# Patient Record
Sex: FEMALE | Race: OTHER | HISPANIC OR LATINO | URBAN - METROPOLITAN AREA
[De-identification: names, ages, dates, MRNs, and addresses within clinical notes are randomized per-mention and may not be internally consistent; named-entity substitution may affect disease eponyms.]

---

## 2020-09-26 VITALS
OXYGEN SATURATION: 96 % | RESPIRATION RATE: 16 BRPM | SYSTOLIC BLOOD PRESSURE: 176 MMHG | DIASTOLIC BLOOD PRESSURE: 91 MMHG | TEMPERATURE: 99 F | HEART RATE: 66 BPM

## 2020-09-26 LAB
ALBUMIN SERPL ELPH-MCNC: 3.9 G/DL — SIGNIFICANT CHANGE UP (ref 3.3–5)
ALP SERPL-CCNC: 93 U/L — SIGNIFICANT CHANGE UP (ref 40–120)
ALT FLD-CCNC: 18 U/L — SIGNIFICANT CHANGE UP (ref 10–45)
ANION GAP SERPL CALC-SCNC: 10 MMOL/L — SIGNIFICANT CHANGE UP (ref 5–17)
APTT BLD: 27.4 SEC — LOW (ref 27.5–35.5)
AST SERPL-CCNC: 16 U/L — SIGNIFICANT CHANGE UP (ref 10–40)
BASOPHILS # BLD AUTO: 0.05 K/UL — SIGNIFICANT CHANGE UP (ref 0–0.2)
BASOPHILS NFR BLD AUTO: 0.7 % — SIGNIFICANT CHANGE UP (ref 0–2)
BILIRUB SERPL-MCNC: 0.4 MG/DL — SIGNIFICANT CHANGE UP (ref 0.2–1.2)
BLD GP AB SCN SERPL QL: NEGATIVE — SIGNIFICANT CHANGE UP
BUN SERPL-MCNC: 36 MG/DL — HIGH (ref 7–23)
CALCIUM SERPL-MCNC: 10.3 MG/DL — SIGNIFICANT CHANGE UP (ref 8.4–10.5)
CHLORIDE SERPL-SCNC: 102 MMOL/L — SIGNIFICANT CHANGE UP (ref 96–108)
CO2 SERPL-SCNC: 29 MMOL/L — SIGNIFICANT CHANGE UP (ref 22–31)
CREAT SERPL-MCNC: 1.31 MG/DL — HIGH (ref 0.5–1.3)
EOSINOPHIL # BLD AUTO: 0.29 K/UL — SIGNIFICANT CHANGE UP (ref 0–0.5)
EOSINOPHIL NFR BLD AUTO: 4.1 % — SIGNIFICANT CHANGE UP (ref 0–6)
GLUCOSE SERPL-MCNC: 213 MG/DL — HIGH (ref 70–99)
HCT VFR BLD CALC: 36.6 % — SIGNIFICANT CHANGE UP (ref 34.5–45)
HGB BLD-MCNC: 11.8 G/DL — SIGNIFICANT CHANGE UP (ref 11.5–15.5)
IMM GRANULOCYTES NFR BLD AUTO: 0.3 % — SIGNIFICANT CHANGE UP (ref 0–1.5)
INR BLD: 0.94 — SIGNIFICANT CHANGE UP (ref 0.88–1.16)
LYMPHOCYTES # BLD AUTO: 2.43 K/UL — SIGNIFICANT CHANGE UP (ref 1–3.3)
LYMPHOCYTES # BLD AUTO: 34.5 % — SIGNIFICANT CHANGE UP (ref 13–44)
MCHC RBC-ENTMCNC: 31.2 PG — SIGNIFICANT CHANGE UP (ref 27–34)
MCHC RBC-ENTMCNC: 32.2 GM/DL — SIGNIFICANT CHANGE UP (ref 32–36)
MCV RBC AUTO: 96.8 FL — SIGNIFICANT CHANGE UP (ref 80–100)
MONOCYTES # BLD AUTO: 0.58 K/UL — SIGNIFICANT CHANGE UP (ref 0–0.9)
MONOCYTES NFR BLD AUTO: 8.2 % — SIGNIFICANT CHANGE UP (ref 2–14)
NEUTROPHILS # BLD AUTO: 3.68 K/UL — SIGNIFICANT CHANGE UP (ref 1.8–7.4)
NEUTROPHILS NFR BLD AUTO: 52.2 % — SIGNIFICANT CHANGE UP (ref 43–77)
NRBC # BLD: 0 /100 WBCS — SIGNIFICANT CHANGE UP (ref 0–0)
PLATELET # BLD AUTO: 333 K/UL — SIGNIFICANT CHANGE UP (ref 150–400)
POTASSIUM SERPL-MCNC: 4.9 MMOL/L — SIGNIFICANT CHANGE UP (ref 3.5–5.3)
POTASSIUM SERPL-SCNC: 4.9 MMOL/L — SIGNIFICANT CHANGE UP (ref 3.5–5.3)
PROT SERPL-MCNC: 7.1 G/DL — SIGNIFICANT CHANGE UP (ref 6–8.3)
PROTHROM AB SERPL-ACNC: 11.3 SEC — SIGNIFICANT CHANGE UP (ref 10.6–13.6)
RBC # BLD: 3.78 M/UL — LOW (ref 3.8–5.2)
RBC # FLD: 12.8 % — SIGNIFICANT CHANGE UP (ref 10.3–14.5)
RH IG SCN BLD-IMP: POSITIVE — SIGNIFICANT CHANGE UP
SODIUM SERPL-SCNC: 141 MMOL/L — SIGNIFICANT CHANGE UP (ref 135–145)
WBC # BLD: 7.05 K/UL — SIGNIFICANT CHANGE UP (ref 3.8–10.5)
WBC # FLD AUTO: 7.05 K/UL — SIGNIFICANT CHANGE UP (ref 3.8–10.5)

## 2020-09-26 PROCEDURE — 74174 CTA ABD&PLVS W/CONTRAST: CPT | Mod: 26

## 2020-09-26 RX ORDER — SODIUM CHLORIDE 9 MG/ML
1000 INJECTION INTRAMUSCULAR; INTRAVENOUS; SUBCUTANEOUS ONCE
Refills: 0 | Status: COMPLETED | OUTPATIENT
Start: 2020-09-26 | End: 2020-09-26

## 2020-09-26 RX ADMIN — SODIUM CHLORIDE 1000 MILLILITER(S): 9 INJECTION INTRAMUSCULAR; INTRAVENOUS; SUBCUTANEOUS at 22:00

## 2020-09-26 NOTE — ED PROVIDER NOTE - HIV OFFER
Previously Declined (within the last year) no discharge, no irritation, no pain, no redness, and no visual changes.

## 2020-09-26 NOTE — ED PROVIDER NOTE - SHIFT CHANGE DETAILS
80F c/o brbpr today. avss. +bright red blood on rectal exam. hb 11.8, pending repeat. other labs wnl. ekg w/o acute abnl. cta a/p w/o acute abnl on prelim. surgery consulted. stable.    dispo: pending final ct read, surgery reccs

## 2020-09-26 NOTE — ED ADULT NURSE NOTE - OBJECTIVE STATEMENT
CC of rectal bleeding x tonight 1 occasion CC of rectal bleeding since Thursday x 3 occasions until tonight. Had a MD check up yesterday and advised to  Hx of HTN, Diverticulitis and had colonoscopy.

## 2020-09-26 NOTE — ED PROVIDER NOTE - PROGRESS NOTE DETAILS
CTA shows no active bleed, pt went to bathroom in ED with blood and clots no stool per the patient, will repeat hemoglobin, surgery consulted for lower GIB, will f/u recommendations surgery evaluated and recommends no acute surgical intervention at this time, hemoglobin was repeated and is 10.1 down from 11.8, will admit to medicine for serial H/H, GI eval if bleeding persists Kristen: received s/o from Dr Carrizales - TAE, s/p CTA w/o active bleeding. Drop in H/H, pending surgical evaluation. Pt seen by surgery - no acute intervention. Pt admitted to medicine for serial H/H

## 2020-09-26 NOTE — ED PROVIDER NOTE - CARE PLAN
Principal Discharge DX:	Rectal bleeding   Principal Discharge DX:	Rectal bleeding  Secondary Diagnosis:	Renal cyst  Secondary Diagnosis:	Cholelithiases

## 2020-09-26 NOTE — ED ADULT NURSE NOTE - NSIMPLEMENTINTERV_GEN_ALL_ED
Implemented All Universal Safety Interventions:  [place] to call system. Call bell, personal items and telephone within reach. Instruct patient to call for assistance. Room bathroom lighting operational. Non-slip footwear when patient is off stretcher. Physically safe environment: no spills, clutter or unnecessary equipment. Stretcher in lowest position, wheels locked, appropriate side rails in place.

## 2020-09-26 NOTE — ED PROVIDER NOTE - CLINICAL SUMMARY MEDICAL DECISION MAKING FREE TEXT BOX
81 y/o m hx HTN, DM presents c/o blood in her bowel movements over the past 2 days, worsening today; pt afebrile, vss in ED, rectal exam with small amount of blood.  Will check labs, plan for CTA a/p, f/u results, reassess.

## 2020-09-26 NOTE — ED PROVIDER NOTE - OBJECTIVE STATEMENT
#[id number]    81 y/o f hx HTN, DM presents c/o blood in her bowel movements over the past 2 days.  Pt reports one episode 2 days ago followed by another yesterday, she contacted her pmd who referred her to GI for coloscopy, has appt for November.  Pt stating today had two more episodes, the most recent was all blood with some clots, no stool.  #[id number]    81 y/o f hx HTN, DM presents c/o blood in her bowel movements over the past 2 days.  Pt reports one episode 2 days ago followed by another yesterday, she contacted her pmd who referred her to GI for coloscopy, has appt for November.  Pt stating today had two more episodes, the most recent was all blood with some clots, no stool.  Pt reports feeling "gas" although denies abd pain.  Denies fever, chills, n/v/d, dysuria, all other ROS negative.

## 2020-09-26 NOTE — ED ADULT NURSE NOTE - OTHER COMPLAINTS
pt Tajik speaking,  #[id number] used for triage, pt reports rectal bleeding since last Thursday, was evaluated by PCP on Friday and referred for colonoscopy but no appts till Novemeber, pt reports increased bleeding this evening, no hx of hemorrhoids, no blood thinners

## 2020-09-26 NOTE — ED ADULT TRIAGE NOTE - OTHER COMPLAINTS
pt Vietnamese speaking,  #[id number] used for triage, pt reports rectal bleeding since last Thursday, was evaluated by PCP on Friday and referred for colonoscopy but no appts till Novemeber, pt reports increased bleeding this evening, no hx of hemorrhoids, no blood thinners

## 2020-09-27 ENCOUNTER — INPATIENT (INPATIENT)
Facility: HOSPITAL | Age: 80
LOS: 1 days | Discharge: HOME | DRG: 378 | End: 2020-09-29
Attending: STUDENT IN AN ORGANIZED HEALTH CARE EDUCATION/TRAINING PROGRAM | Admitting: STUDENT IN AN ORGANIZED HEALTH CARE EDUCATION/TRAINING PROGRAM
Payer: MEDICARE

## 2020-09-27 DIAGNOSIS — E11.42 TYPE 2 DIABETES MELLITUS WITH DIABETIC POLYNEUROPATHY: ICD-10-CM

## 2020-09-27 DIAGNOSIS — K62.5 HEMORRHAGE OF ANUS AND RECTUM: ICD-10-CM

## 2020-09-27 DIAGNOSIS — D62 ACUTE POSTHEMORRHAGIC ANEMIA: ICD-10-CM

## 2020-09-27 DIAGNOSIS — R63.8 OTHER SYMPTOMS AND SIGNS CONCERNING FOOD AND FLUID INTAKE: ICD-10-CM

## 2020-09-27 DIAGNOSIS — I10 ESSENTIAL (PRIMARY) HYPERTENSION: ICD-10-CM

## 2020-09-27 DIAGNOSIS — E03.9 HYPOTHYROIDISM, UNSPECIFIED: ICD-10-CM

## 2020-09-27 LAB
A1C WITH ESTIMATED AVERAGE GLUCOSE RESULT: 6.5 % — HIGH (ref 4–5.6)
ALBUMIN SERPL ELPH-MCNC: 3.5 G/DL — SIGNIFICANT CHANGE UP (ref 3.3–5)
ALP SERPL-CCNC: 80 U/L — SIGNIFICANT CHANGE UP (ref 40–120)
ALT FLD-CCNC: 13 U/L — SIGNIFICANT CHANGE UP (ref 10–45)
ANION GAP SERPL CALC-SCNC: 10 MMOL/L — SIGNIFICANT CHANGE UP (ref 5–17)
AST SERPL-CCNC: 10 U/L — SIGNIFICANT CHANGE UP (ref 10–40)
BASOPHILS # BLD AUTO: 0.04 K/UL — SIGNIFICANT CHANGE UP (ref 0–0.2)
BASOPHILS # BLD AUTO: 0.05 K/UL — SIGNIFICANT CHANGE UP (ref 0–0.2)
BASOPHILS # BLD AUTO: 0.06 K/UL — SIGNIFICANT CHANGE UP (ref 0–0.2)
BASOPHILS # BLD AUTO: 0.06 K/UL — SIGNIFICANT CHANGE UP (ref 0–0.2)
BASOPHILS NFR BLD AUTO: 0.6 % — SIGNIFICANT CHANGE UP (ref 0–2)
BASOPHILS NFR BLD AUTO: 0.7 % — SIGNIFICANT CHANGE UP (ref 0–2)
BASOPHILS NFR BLD AUTO: 0.8 % — SIGNIFICANT CHANGE UP (ref 0–2)
BASOPHILS NFR BLD AUTO: 1 % — SIGNIFICANT CHANGE UP (ref 0–2)
BILIRUB SERPL-MCNC: 0.5 MG/DL — SIGNIFICANT CHANGE UP (ref 0.2–1.2)
BLD GP AB SCN SERPL QL: NEGATIVE — SIGNIFICANT CHANGE UP
BUN SERPL-MCNC: 27 MG/DL — HIGH (ref 7–23)
CALCIUM SERPL-MCNC: 9 MG/DL — SIGNIFICANT CHANGE UP (ref 8.4–10.5)
CHLORIDE SERPL-SCNC: 106 MMOL/L — SIGNIFICANT CHANGE UP (ref 96–108)
CO2 SERPL-SCNC: 26 MMOL/L — SIGNIFICANT CHANGE UP (ref 22–31)
CREAT SERPL-MCNC: 1.25 MG/DL — SIGNIFICANT CHANGE UP (ref 0.5–1.3)
EOSINOPHIL # BLD AUTO: 0.28 K/UL — SIGNIFICANT CHANGE UP (ref 0–0.5)
EOSINOPHIL # BLD AUTO: 0.28 K/UL — SIGNIFICANT CHANGE UP (ref 0–0.5)
EOSINOPHIL # BLD AUTO: 0.29 K/UL — SIGNIFICANT CHANGE UP (ref 0–0.5)
EOSINOPHIL # BLD AUTO: 0.29 K/UL — SIGNIFICANT CHANGE UP (ref 0–0.5)
EOSINOPHIL NFR BLD AUTO: 3.8 % — SIGNIFICANT CHANGE UP (ref 0–6)
EOSINOPHIL NFR BLD AUTO: 4.2 % — SIGNIFICANT CHANGE UP (ref 0–6)
EOSINOPHIL NFR BLD AUTO: 4.5 % — SIGNIFICANT CHANGE UP (ref 0–6)
EOSINOPHIL NFR BLD AUTO: 4.5 % — SIGNIFICANT CHANGE UP (ref 0–6)
ESTIMATED AVERAGE GLUCOSE: 140 MG/DL — HIGH (ref 68–114)
GLUCOSE BLDC GLUCOMTR-MCNC: 109 MG/DL — HIGH (ref 70–99)
GLUCOSE SERPL-MCNC: 109 MG/DL — HIGH (ref 70–99)
HCT VFR BLD CALC: 30.9 % — LOW (ref 34.5–45)
HCT VFR BLD CALC: 32.1 % — LOW (ref 34.5–45)
HCT VFR BLD CALC: 33.1 % — LOW (ref 34.5–45)
HCT VFR BLD CALC: 33.3 % — LOW (ref 34.5–45)
HCT VFR BLD CALC: 33.9 % — LOW (ref 34.5–45)
HGB BLD-MCNC: 10.1 G/DL — LOW (ref 11.5–15.5)
HGB BLD-MCNC: 10.5 G/DL — LOW (ref 11.5–15.5)
HGB BLD-MCNC: 10.6 G/DL — LOW (ref 11.5–15.5)
HGB BLD-MCNC: 10.7 G/DL — LOW (ref 11.5–15.5)
HGB BLD-MCNC: 9.8 G/DL — LOW (ref 11.5–15.5)
IMM GRANULOCYTES NFR BLD AUTO: 0.3 % — SIGNIFICANT CHANGE UP (ref 0–1.5)
IMM GRANULOCYTES NFR BLD AUTO: 0.4 % — SIGNIFICANT CHANGE UP (ref 0–1.5)
IMM GRANULOCYTES NFR BLD AUTO: 0.4 % — SIGNIFICANT CHANGE UP (ref 0–1.5)
IMM GRANULOCYTES NFR BLD AUTO: 0.5 % — SIGNIFICANT CHANGE UP (ref 0–1.5)
INR BLD: 0.95 — SIGNIFICANT CHANGE UP (ref 0.88–1.16)
LYMPHOCYTES # BLD AUTO: 2.43 K/UL — SIGNIFICANT CHANGE UP (ref 1–3.3)
LYMPHOCYTES # BLD AUTO: 2.56 K/UL — SIGNIFICANT CHANGE UP (ref 1–3.3)
LYMPHOCYTES # BLD AUTO: 2.74 K/UL — SIGNIFICANT CHANGE UP (ref 1–3.3)
LYMPHOCYTES # BLD AUTO: 2.88 K/UL — SIGNIFICANT CHANGE UP (ref 1–3.3)
LYMPHOCYTES # BLD AUTO: 36.3 % — SIGNIFICANT CHANGE UP (ref 13–44)
LYMPHOCYTES # BLD AUTO: 37.6 % — SIGNIFICANT CHANGE UP (ref 13–44)
LYMPHOCYTES # BLD AUTO: 41 % — SIGNIFICANT CHANGE UP (ref 13–44)
LYMPHOCYTES # BLD AUTO: 42.8 % — SIGNIFICANT CHANGE UP (ref 13–44)
MAGNESIUM SERPL-MCNC: 1.8 MG/DL — SIGNIFICANT CHANGE UP (ref 1.6–2.6)
MCHC RBC-ENTMCNC: 30.7 PG — SIGNIFICANT CHANGE UP (ref 27–34)
MCHC RBC-ENTMCNC: 30.7 PG — SIGNIFICANT CHANGE UP (ref 27–34)
MCHC RBC-ENTMCNC: 30.9 PG — SIGNIFICANT CHANGE UP (ref 27–34)
MCHC RBC-ENTMCNC: 30.9 PG — SIGNIFICANT CHANGE UP (ref 27–34)
MCHC RBC-ENTMCNC: 31 PG — SIGNIFICANT CHANGE UP (ref 27–34)
MCHC RBC-ENTMCNC: 31.5 GM/DL — LOW (ref 32–36)
MCHC RBC-ENTMCNC: 31.6 GM/DL — LOW (ref 32–36)
MCHC RBC-ENTMCNC: 31.7 GM/DL — LOW (ref 32–36)
MCHC RBC-ENTMCNC: 31.7 GM/DL — LOW (ref 32–36)
MCHC RBC-ENTMCNC: 31.8 GM/DL — LOW (ref 32–36)
MCV RBC AUTO: 96.5 FL — SIGNIFICANT CHANGE UP (ref 80–100)
MCV RBC AUTO: 97.1 FL — SIGNIFICANT CHANGE UP (ref 80–100)
MCV RBC AUTO: 97.5 FL — SIGNIFICANT CHANGE UP (ref 80–100)
MCV RBC AUTO: 97.6 FL — SIGNIFICANT CHANGE UP (ref 80–100)
MCV RBC AUTO: 98.2 FL — SIGNIFICANT CHANGE UP (ref 80–100)
MONOCYTES # BLD AUTO: 0.58 K/UL — SIGNIFICANT CHANGE UP (ref 0–0.9)
MONOCYTES # BLD AUTO: 0.58 K/UL — SIGNIFICANT CHANGE UP (ref 0–0.9)
MONOCYTES # BLD AUTO: 0.67 K/UL — SIGNIFICANT CHANGE UP (ref 0–0.9)
MONOCYTES # BLD AUTO: 0.71 K/UL — SIGNIFICANT CHANGE UP (ref 0–0.9)
MONOCYTES NFR BLD AUTO: 10.4 % — SIGNIFICANT CHANGE UP (ref 2–14)
MONOCYTES NFR BLD AUTO: 8.6 % — SIGNIFICANT CHANGE UP (ref 2–14)
MONOCYTES NFR BLD AUTO: 9.3 % — SIGNIFICANT CHANGE UP (ref 2–14)
MONOCYTES NFR BLD AUTO: 9.4 % — SIGNIFICANT CHANGE UP (ref 2–14)
NEUTROPHILS # BLD AUTO: 2.74 K/UL — SIGNIFICANT CHANGE UP (ref 1.8–7.4)
NEUTROPHILS # BLD AUTO: 2.91 K/UL — SIGNIFICANT CHANGE UP (ref 1.8–7.4)
NEUTROPHILS # BLD AUTO: 3 K/UL — SIGNIFICANT CHANGE UP (ref 1.8–7.4)
NEUTROPHILS # BLD AUTO: 3.72 K/UL — SIGNIFICANT CHANGE UP (ref 1.8–7.4)
NEUTROPHILS NFR BLD AUTO: 43.3 % — SIGNIFICANT CHANGE UP (ref 43–77)
NEUTROPHILS NFR BLD AUTO: 43.9 % — SIGNIFICANT CHANGE UP (ref 43–77)
NEUTROPHILS NFR BLD AUTO: 46.4 % — SIGNIFICANT CHANGE UP (ref 43–77)
NEUTROPHILS NFR BLD AUTO: 49.3 % — SIGNIFICANT CHANGE UP (ref 43–77)
NRBC # BLD: 0 /100 WBCS — SIGNIFICANT CHANGE UP (ref 0–0)
PLATELET # BLD AUTO: 264 K/UL — SIGNIFICANT CHANGE UP (ref 150–400)
PLATELET # BLD AUTO: 273 K/UL — SIGNIFICANT CHANGE UP (ref 150–400)
PLATELET # BLD AUTO: 291 K/UL — SIGNIFICANT CHANGE UP (ref 150–400)
PLATELET # BLD AUTO: 305 K/UL — SIGNIFICANT CHANGE UP (ref 150–400)
PLATELET # BLD AUTO: 312 K/UL — SIGNIFICANT CHANGE UP (ref 150–400)
POTASSIUM SERPL-MCNC: 4.5 MMOL/L — SIGNIFICANT CHANGE UP (ref 3.5–5.3)
POTASSIUM SERPL-SCNC: 4.5 MMOL/L — SIGNIFICANT CHANGE UP (ref 3.5–5.3)
PROT SERPL-MCNC: 5.8 G/DL — LOW (ref 6–8.3)
PROTHROM AB SERPL-ACNC: 11.4 SEC — SIGNIFICANT CHANGE UP (ref 10.6–13.6)
RBC # BLD: 3.17 M/UL — LOW (ref 3.8–5.2)
RBC # BLD: 3.27 M/UL — LOW (ref 3.8–5.2)
RBC # BLD: 3.39 M/UL — LOW (ref 3.8–5.2)
RBC # BLD: 3.45 M/UL — LOW (ref 3.8–5.2)
RBC # BLD: 3.49 M/UL — LOW (ref 3.8–5.2)
RBC # FLD: 12.9 % — SIGNIFICANT CHANGE UP (ref 10.3–14.5)
RBC # FLD: 13 % — SIGNIFICANT CHANGE UP (ref 10.3–14.5)
RBC # FLD: 13 % — SIGNIFICANT CHANGE UP (ref 10.3–14.5)
RH IG SCN BLD-IMP: POSITIVE — SIGNIFICANT CHANGE UP
SARS-COV-2 RNA SPEC QL NAA+PROBE: SIGNIFICANT CHANGE UP
SODIUM SERPL-SCNC: 142 MMOL/L — SIGNIFICANT CHANGE UP (ref 135–145)
WBC # BLD: 6.24 K/UL — SIGNIFICANT CHANGE UP (ref 3.8–10.5)
WBC # BLD: 6.46 K/UL — SIGNIFICANT CHANGE UP (ref 3.8–10.5)
WBC # BLD: 6.73 K/UL — SIGNIFICANT CHANGE UP (ref 3.8–10.5)
WBC # BLD: 7.55 K/UL — SIGNIFICANT CHANGE UP (ref 3.8–10.5)
WBC # BLD: 7.65 K/UL — SIGNIFICANT CHANGE UP (ref 3.8–10.5)
WBC # FLD AUTO: 6.24 K/UL — SIGNIFICANT CHANGE UP (ref 3.8–10.5)
WBC # FLD AUTO: 6.46 K/UL — SIGNIFICANT CHANGE UP (ref 3.8–10.5)
WBC # FLD AUTO: 6.73 K/UL — SIGNIFICANT CHANGE UP (ref 3.8–10.5)
WBC # FLD AUTO: 7.55 K/UL — SIGNIFICANT CHANGE UP (ref 3.8–10.5)
WBC # FLD AUTO: 7.65 K/UL — SIGNIFICANT CHANGE UP (ref 3.8–10.5)

## 2020-09-27 PROCEDURE — 93010 ELECTROCARDIOGRAM REPORT: CPT

## 2020-09-27 PROCEDURE — 99223 1ST HOSP IP/OBS HIGH 75: CPT | Mod: GC

## 2020-09-27 PROCEDURE — 99285 EMERGENCY DEPT VISIT HI MDM: CPT

## 2020-09-27 PROCEDURE — 99222 1ST HOSP IP/OBS MODERATE 55: CPT | Mod: GC

## 2020-09-27 RX ORDER — METFORMIN HYDROCHLORIDE 850 MG/1
1 TABLET ORAL
Qty: 0 | Refills: 0 | DISCHARGE

## 2020-09-27 RX ORDER — DONEPEZIL HYDROCHLORIDE 10 MG/1
1 TABLET, FILM COATED ORAL
Qty: 0 | Refills: 0 | DISCHARGE

## 2020-09-27 RX ORDER — SOD SULF/SODIUM/NAHCO3/KCL/PEG
2000 SOLUTION, RECONSTITUTED, ORAL ORAL ONCE
Refills: 0 | Status: COMPLETED | OUTPATIENT
Start: 2020-09-27 | End: 2020-09-27

## 2020-09-27 RX ORDER — ATENOLOL 25 MG/1
1 TABLET ORAL
Qty: 0 | Refills: 0 | DISCHARGE

## 2020-09-27 RX ORDER — SOD SULF/SODIUM/NAHCO3/KCL/PEG
2000 SOLUTION, RECONSTITUTED, ORAL ORAL ONCE
Refills: 0 | Status: COMPLETED | OUTPATIENT
Start: 2020-09-28 | End: 2020-09-28

## 2020-09-27 RX ORDER — LEVOTHYROXINE SODIUM 125 MCG
1 TABLET ORAL
Qty: 0 | Refills: 0 | DISCHARGE

## 2020-09-27 RX ORDER — GABAPENTIN 400 MG/1
300 CAPSULE ORAL
Qty: 0 | Refills: 0 | DISCHARGE

## 2020-09-27 RX ORDER — LEVOTHYROXINE SODIUM 125 MCG
125 TABLET ORAL DAILY
Refills: 0 | Status: DISCONTINUED | OUTPATIENT
Start: 2020-09-27 | End: 2020-09-29

## 2020-09-27 RX ORDER — GABAPENTIN 400 MG/1
300 CAPSULE ORAL ONCE
Refills: 0 | Status: COMPLETED | OUTPATIENT
Start: 2020-09-27 | End: 2020-09-27

## 2020-09-27 RX ORDER — DONEPEZIL HYDROCHLORIDE 10 MG/1
10 TABLET, FILM COATED ORAL AT BEDTIME
Refills: 0 | Status: DISCONTINUED | OUTPATIENT
Start: 2020-09-27 | End: 2020-09-29

## 2020-09-27 RX ORDER — INFLUENZA VIRUS VACCINE 15; 15; 15; 15 UG/.5ML; UG/.5ML; UG/.5ML; UG/.5ML
0.7 SUSPENSION INTRAMUSCULAR ONCE
Refills: 0 | Status: COMPLETED | OUTPATIENT
Start: 2020-09-27 | End: 2020-09-27

## 2020-09-27 RX ORDER — INFLUENZA VIRUS VACCINE 15; 15; 15; 15 UG/.5ML; UG/.5ML; UG/.5ML; UG/.5ML
0.5 SUSPENSION INTRAMUSCULAR ONCE
Refills: 0 | Status: DISCONTINUED | OUTPATIENT
Start: 2020-09-27 | End: 2020-09-27

## 2020-09-27 RX ADMIN — Medication 2000 MILLILITER(S): at 18:12

## 2020-09-27 RX ADMIN — Medication 125 MICROGRAM(S): at 06:40

## 2020-09-27 RX ADMIN — GABAPENTIN 300 MILLIGRAM(S): 400 CAPSULE ORAL at 06:39

## 2020-09-27 RX ADMIN — DONEPEZIL HYDROCHLORIDE 10 MILLIGRAM(S): 10 TABLET, FILM COATED ORAL at 21:32

## 2020-09-27 NOTE — PROGRESS NOTE ADULT - ASSESSMENT
Ms. Prather is a 80 year old female with a past medical history of hypothyroidism, HTN and DM who presents with rectal bleeding x 3 days. She was admitted for rectal bleeding likely due to diverticulitis.    - Please monitor vitals and hemoglobin daily  - Notify surgery if any changes in frequency and amount of bleeding, and abrupt change in hemoglobin levels.   - We suggest colonoscopy to find the source of bleeding and rule out colonic pathologies that can explain the bleeding.

## 2020-09-27 NOTE — H&P ADULT - PROBLEM SELECTOR PLAN 2
Management as above  - Bleed likely 2/2 diverticulitis  - Maintain active T & S and monitor vital signs for hemodynamic instability 2/2 GI bleed

## 2020-09-27 NOTE — H&P ADULT - NSICDXPASTMEDICALHX_GEN_ALL_CORE_FT
PAST MEDICAL HISTORY:  Diverticulitis     DM (diabetes mellitus)     HTN (hypertension)     Hypothyroidism

## 2020-09-27 NOTE — PROGRESS NOTE ADULT - SUBJECTIVE AND OBJECTIVE BOX
SUBJECTIVE: Patient seen and examined bedside by chief resident. Patient reports feeling well and slept without issues. Specifically denies nausea, vomit, chest pain, SOB, calves tenderness, fever, dizziness, disuria. Good acceptance of diet without nausea, vomit or postpandrial discomfort, out of bed and ambulating. Flatus and bowel movement presents.         Vital Signs Last 24 Hrs  T(C): 36.7 (27 Sep 2020 07:42), Max: 37.1 (26 Sep 2020 21:15)  T(F): 98.1 (27 Sep 2020 07:42), Max: 98.8 (26 Sep 2020 21:15)  HR: 60 (27 Sep 2020 07:42) (60 - 66)  BP: 149/83 (27 Sep 2020 07:42) (149/83 - 176/91)  BP(mean): --  RR: 18 (27 Sep 2020 07:42) (16 - 18)  SpO2: 96% (27 Sep 2020 07:42) (96% - 97%)  I&O's Detail      PHYSICAL EXAMINATION   General: NAD, resting comfortably in bed.   NEURO: AAOx3, follows commands.   HEENT: MMM, non icteric,   CV: RRR, no MRG  PULM: nonlabored breathing, no respiratory distress  ABD: soft, nondistended, appropriately tender, no rebound, no guarding, no tympany. Incisions CDI and without hematoma or erythema    EXTREM: WWP, no edema, no calf tenderness  VASC: No cyanosis,   no pallor, palpable radial and pulses pedis 2+ bilaterally  SKIN: No rashes noted  PSYCH: Appropriate affect, answers questions appropriately      LABS:                        9.8    6.46  )-----------( 264      ( 27 Sep 2020 10:24 )             30.9     09-27    142  |  106  |  27<H>  ----------------------------<  109<H>  4.5   |  26  |  1.25    Ca    9.0      27 Sep 2020 10:24  Mg     1.8     09-27    TPro  5.8<L>  /  Alb  3.5  /  TBili  0.5  /  DBili  x   /  AST  10  /  ALT  13  /  AlkPhos  80  09-27    PT/INR - ( 27 Sep 2020 10:24 )   PT: 11.4 sec;   INR: 0.95          PTT - ( 26 Sep 2020 22:08 )  PTT:27.4 sec      SUBJECTIVE: Patient seen and examined bedside by chief resident. Patient reports feeling well, no bleeding noticed in the past 24h, no pain or bloody BM was reported. Specifically denies nausea, vomit, chest pain, SOB, calves tenderness, fever, dizziness, disuria.     Vital Signs Last 24 Hrs  T(C): 36.7 (27 Sep 2020 07:42), Max: 37.1 (26 Sep 2020 21:15)  T(F): 98.1 (27 Sep 2020 07:42), Max: 98.8 (26 Sep 2020 21:15)  HR: 60 (27 Sep 2020 07:42) (60 - 66)  BP: 149/83 (27 Sep 2020 07:42) (149/83 - 176/91)  BP(mean): --  RR: 18 (27 Sep 2020 07:42) (16 - 18)  SpO2: 96% (27 Sep 2020 07:42) (96% - 97%)  I&O's Detail      PHYSICAL EXAMINATION   General: NAD, resting comfortably in bed.   NEURO: follows commands.   HEENT: MMM, non icteric,   PULM: nonlabored breathing, no respiratory distress  ABD: soft, nondistended, NT, no rebound, no guarding, no tympany.   EXTREM: WWP, no edema, no calf tenderness  VASC: No cyanosis,   no pallor, palpable radial and pulses pedis 2+ bilaterally  SKIN: No rashes noted  PSYCH: Appropriate affect, answers questions appropriately      LABS:                        9.8    6.46  )-----------( 264      ( 27 Sep 2020 10:24 )             30.9     09-27    142  |  106  |  27<H>  ----------------------------<  109<H>  4.5   |  26  |  1.25    Ca    9.0      27 Sep 2020 10:24  Mg     1.8     09-27    TPro  5.8<L>  /  Alb  3.5  /  TBili  0.5  /  DBili  x   /  AST  10  /  ALT  13  /  AlkPhos  80  09-27    PT/INR - ( 27 Sep 2020 10:24 )   PT: 11.4 sec;   INR: 0.95          PTT - ( 26 Sep 2020 22:08 )  PTT:27.4 sec

## 2020-09-27 NOTE — CONSULT NOTE ADULT - SUBJECTIVE AND OBJECTIVE BOX
GASTROENTEROLOGY CONSULT NOTE  HPI:  Ms. Prather is a 80 year old female with a past medical history of hypothyroidism, HTN and DM who presents with rectal bleeding x 3 days. On 9/24, Ms. Prather noted blood in her stool. She denies any abdominal pain or lightheadedness at this time. On 9/25, she noticed nola blood in her stool. On 9/26, it progressed to nola blood per rectum without BM. She contacted her PCP who recommended that she get a colonoscopy. This was scheduled for November. Since the bleeding persisted, she came to [facility] ED. She denies any SOB, chest pain, dizziness, [place] F[place].    In the ED, he was afebrile 98.8, /91, HR 66, RR 16, and O2 sat of 96 on RA. Labs were significant for Hgb of 11.8 (which dropped 10.9 4 hours later), BUN 36, Cr 1.31. CT Angio Wall thickening of the descending and proximal sigmoid colon may represent colitis versus underdistention and severe descending and sigmoid colon diverticulosis is noted.. Surgery was consulted in the ED and recommends trending CBC q6 hours, colonoscopy and outpatient follow up with surgery for diverticular bleed. She was given 1 L NS bolus in ED. She was admitted for rectal bleeding likely due to diverticulitis.  (27 Sep 2020 05:52)    Allergies    Fish (Unknown)    Intolerances      Home Medications:  atenolol 100 mg oral tablet: 1 tab(s) orally once a day (27 Sep 2020 03:42)  donepezil 10 mg oral tablet: 1 tab(s) orally once a day (at bedtime) (27 Sep 2020 03:42)  gabapentin 300 mg oral tablet: 300 milligram(s) orally once a day (27 Sep 2020 03:42)  levothyroxine 125 mcg (0.125 mg) oral tablet: 1 tab(s) orally once a day (27 Sep 2020 03:42)  metFORMIN 500 mg oral tablet: 1 tab(s) orally 2 times a day (27 Sep 2020 03:42)    MEDICATIONS:  MEDICATIONS  (STANDING):  donepezil 10 milliGRAM(s) Oral at bedtime  influenza  Vaccine (HIGH DOSE) 0.7 milliLiter(s) IntraMuscular once  levothyroxine 125 MICROGram(s) Oral daily  polyethylene glycol/electrolyte Solution. 2000 milliLiter(s) Oral once    MEDICATIONS  (PRN):    PAST MEDICAL & SURGICAL HISTORY:  Hypothyroidism    Diverticulitis    HTN (hypertension)    DM (diabetes mellitus)      FAMILY HISTORY:    SOCIAL HISTORY:  [place]: [ ] Current, [ ] Former, [ ] Never; Pack Years:  Alcohol:  Illicit Drugs:    REVIEW OF SYSTEMS:  CONSTITUTIONAL: No weakness, fevers or chills  HEENT: No visual changes; No vertigo or throat pain   NECK: No pain or stiffness  RESPIRATORY: No cough, wheezing, hemoptysis; No shortness of breath  CARDIOVASCULAR: No chest pain or palpitations  GASTROINTESTINAL: as above  GENITOURINARY: No dysuria, frequency or hematuria  NEUROLOGICAL: No numbness or weakness  SKIN: No itching, burning, rashes, or lesions   All other 10 review of systems is negative unless indicated above.    Vital Signs Last 24 Hrs  T(C): 36.5 (27 Sep 2020 14:08), Max: 37.1 (26 Sep 2020 21:15)  T(F): 97.7 (27 Sep 2020 14:08), Max: 98.8 (26 Sep 2020 21:15)  HR: 53 (27 Sep 2020 14:08) (53 - 66)  BP: 131/73 (27 Sep 2020 14:08) (131/73 - 176/91)  BP(mean): --  RR: 18 (27 Sep 2020 14:08) (16 - 18)  SpO2: 96% (27 Sep 2020 14:08) (96% - 97%)      PHYSICAL EXAM:    General: Well developed; well nourished; in no acute distress  Eyes: Anicteric sclerae, moist conjunctivae  HENT: Moist mucous membranes  Neck: Trachea midline, supple  Lungs: Normal respiratory effort, no intercostal retractions  Cardiovascular: RRR  Abdomen: Soft, obese, non tender, non-distended; No rebound or guarding  Extremities: Normal range of motion, No clubbing, cyanosis or edema  Neurological: Alert and oriented x3  Skin: Warm and dry. No obvious rash    LABS:                        10.6   6.73  )-----------( 305      ( 27 Sep 2020 15:33 )             33.3     09-27    142  |  106  |  27<H>  ----------------------------<  109<H>  4.5   |  26  |  1.25    Ca    9.0      27 Sep 2020 10:24  Mg     1.8     09-27    TPro  5.8<L>  /  Alb  3.5  /  TBili  0.5  /  DBili  x   /  AST  10  /  ALT  13  /  AlkPhos  80  09-27        PT/INR - ( 27 Sep 2020 10:24 )   PT: 11.4 sec;   INR: 0.95          PTT - ( 26 Sep 2020 22:08 )  PTT:27.4 sec    RADIOLOGY & ADDITIONAL STUDIES:     Reviewed

## 2020-09-27 NOTE — H&P ADULT - PROBLEM SELECTOR PLAN 5
Takes metformin 500 mg at home  - Gabapentin listed as home medication; unclear if designated for diabetic neuropathy  - F/U AM A1c

## 2020-09-27 NOTE — H&P ADULT - ATTENDING COMMENTS
Patient seen and examined at bedside. Agree with exam, a/p as above with the following addendum/edits:     Patient p/w BRBPR w/ concern for diverticular bleed. H/H dropped 2 units but hemodynamically stable, trend CBC. Consult GI for inpatient c-scope. Check orthostatics and c/w IVF.

## 2020-09-27 NOTE — CONSULT NOTE ADULT - ASSESSMENT
In brief, 81 y/o f hx HTN, DM, Hypothyroidism, Stroke and PSH for breast lumpectomy for benign mass and uterine prolapse. She presents to the ED today with c/o blood in her bowel movements over the past 2 days.  Last colonoscopy 20 years ago showing diverticuli in the colon. CTA AP shows no active bleeding.    Recs  - Admit to medicine and colonoscopy  - Covid test  - Type and Screen  - CXR, EKG  - Trend H&H Q6 hours  - mIVF  - If rebleed, repeat CTA AP  - If active bleed found, IR embolization  - No plan for surgical intervention at this point  - General Surgery will continue to follow.  - Plan discussed with chief and attending. In brief, 81 y/o f hx HTN, DM, Hypothyroidism, Stroke and PSH for breast lumpectomy for benign mass and uterine prolapse. She presents to the ED today with c/o blood in her bowel movements over the past 2 days.  Last colonoscopy 20 years ago showing diverticuli in the colon. CTA AP shows no active bleeding.    Recs  - Admit to medicine and colonoscopy  - Covid test  - Type and Screen  - CXR, EKG  - Trend H&H Q6 hours  - mIVF  - If rebleed, repeat CTA AP  - If active bleed found, IR embolization  - No plan for surgical intervention at this point  - General Surgery will continue to follow.  - For cystic dilation of the appendix c/f mucocele, follow up with general surgery outpatient.  - Plan discussed with chief and attending.

## 2020-09-27 NOTE — H&P ADULT - NSHPPHYSICALEXAM_GEN_ALL_CORE
VITAL SIGNS:  T(C): 36.6 (09-27-20 @ 04:44), Max: 37.1 (09-26-20 @ 21:15)  T(F): 97.9 (09-27-20 @ 04:44), Max: 98.8 (09-26-20 @ 21:15)  HR: 60 (09-27-20 @ 04:44) (60 - 66)  BP: 167/94 (09-27-20 @ 04:44) (167/94 - 176/91)  RR: 18 (09-27-20 @ 04:44) (16 - 18)  SpO2: 97% (09-27-20 @ 04:44) (96% - 97%)      PHYSICAL EXAM:    Constitutional: WDWN, lying comfortably in bed, NAD  HEENT: PERRLA, EOMI, no jvd   Respiratory: CTAB, no increased work of breathing  Cardiac: +S1/S2, +RRR, no murmurs  Gastrointestinal: soft, non-tender, non-distended, normoactive BS  Extremities: WWP, no clubbing or cyanosis, no peripheral edema  Musculoskeletal: NROM x4; no joint swelling, tenderness or erythema  Vascular: 2+ radial and DP pulses b/l  Dermatologic: skin warm, dry and intact, no rashes, wounds, or scars  Rectal exam: Octavio blood without sign of skin tags, hemorrhoids or fistula  Neurologic: AAOx3, CNII-XII grossly intact, no focal deficits  Psychiatric: affect and characteristics of appearance, verbalizations, behaviors are appropriate

## 2020-09-27 NOTE — H&P ADULT - PROBLEM SELECTOR PLAN 1
Rectal bleeding x 3 days and in increasing quantity. Hgb currently stable at 10.1  - Rectal exam showed brown stool with nola blood  - Surgery recommending q6 CBC and Colonoscopy  - CT read concerning for diverticulitis at descending and sigmoid colon with possible colitis at sigmoid colon  -

## 2020-09-27 NOTE — H&P ADULT - ASSESSMENT
Ms. Prather is a 80 year old female with a past medical history of hypothyroidism, HTN and DM who presents with rectal bleeding x 3 days. She was admitted for rectal bleeding likely due to diverticulitis.

## 2020-09-27 NOTE — H&P ADULT - PROBLEM SELECTOR PLAN 6
Takes atenolol 100 mg daily  - Hold in setting of GI bleed; medication could mask compensatory response of increasing HR

## 2020-09-27 NOTE — H&P ADULT - PROBLEM SELECTOR PLAN 7
F: s/p 1 L NS; LR @ 75 for maintenance  E: Replete K <4; Mg <2  N: NPO pending sigmoidoscopy    DVT: None  Gi: none

## 2020-09-27 NOTE — H&P ADULT - HISTORY OF PRESENT ILLNESS
Ms. Prather is a 80 year old female with a past medical history of hypothyroidism, HTN and DM who presents with rectal bleeding x 3 days. On 9/24, Ms. Prather noted blood in her stool. She denies any abdominal pain or lightheadedness at this time. On 9/25, she noticed nola blood in her stool. On 9/26, it progressed to nola blood per rectum without BM. She contacted her PCP who recommended that she get a colonoscopy. This was scheduled for November. Since the bleeding persisted, she came to [facility] ED. She denies any SOB, chest pain, dizziness, [place].    In the ED, he was afebrile 98.8, /91, HR 66, RR 16, and O2 sat of 96 on RA. Labs were significant for Hgb of 11.8 (which dropped 10.9 4 hours later), BUN 36, Cr 1.31. CT Angio Wall thickening of the descending and proximal sigmoid colon may represent colitis versus underdistention and severe descending and sigmoid colon diverticulosis is noted.. Surgery was consulted in the ED and recommends trending CBC q6 hours, colonoscopy and outpatient follow up with surgery for diverticular bleed. She was given 1 L NS bolus in ED. She was admitted for rectal bleeding likely due to diverticulitis.

## 2020-09-27 NOTE — CONSULT NOTE ADULT - ASSESSMENT
80 F PMH HTN and DM who presents with rectal bleeding x 3 days. Initially started as blood in stools and progressed to nola BRBPR. Last rectal bleeding last night with no episodes today so far. Last c'scopy several years back with no details available.  Hgb of 11.8-->10.6. CT Angio showed wall thickening of the descending and proximal sigmoid colon.    # BRBPR  # sigmoid thickening  DD: diverticular bleeding with SCAD. R/o other infectious vs inflammatory etiology  - Plan for colonoscopy tomorrow  - Please order split-dose prep with 4L GoLytely: 2L GoLytely given at 17:00 today and an additional 2L at 05:00 tomorrow  - Clear liquid diet for now  - NPO past MN except medications with sips of water (and remainining 2L GoLytely at 05:00 am as mentioned above)  - stool for GI PCR, C diff  - Monitor CBC and transfuse PRN to goal Hb>7  - In case of active bleeding consider IR embolization and surgical f/u    Recommendations discussed with primary team  Plan discussed with GI service attending    Kirt Cotton MD  PGY-4 GI fellow  Pager: [phone]

## 2020-09-27 NOTE — H&P ADULT - NSHPLABSRESULTS_GEN_ALL_CORE
LABS:                         10.1   7.55  )-----------( 273      ( 27 Sep 2020 02:09 )             32.1     09-26    141  |  102  |  36<H>  ----------------------------<  213<H>  4.9   |  29  |  1.31<H>    Ca    10.3      26 Sep 2020 22:08    TPro  7.1  /  Alb  3.9  /  TBili  0.4  /  DBili  x   /  AST  16  /  ALT  18  /  AlkPhos  93  09-26    PT/INR - ( 26 Sep 2020 22:08 )   PT: 11.3 sec;   INR: 0.94          PTT - ( 26 Sep 2020 22:08 )  PTT:27.4 sec              RADIOLOGY, EKG & ADDITIONAL TESTS: Reviewed.

## 2020-09-27 NOTE — CONSULT NOTE ADULT - SUBJECTIVE AND OBJECTIVE BOX
SURGERY CONSULT  ==============================================================================================================  HPI:81 y/o f hx HTN, DM, Hypothyroidism, Stroke and PSH for breast lumpectomy for benign mass and uterine prolapse. She presents to the ED today with c/o blood in her bowel movements over the past 2 days.  Pt reports one episode 2 days ago followed by another yesterday, she contacted her pmd who referred her to GI for coloscopy, has appt for November.  Pt stating today had two more episodes, the most recent was all blood with some clots, no stool.  Pt reports feeling "gas" although denies abd pain.  Denies fever, chills, n/v/d, dysuria, all other ROS negative.    Her last colonoscopy was 20 years ago and it revealed diverticuli in the colon per patient's daughter. CTA AP reveals no active bleeding. In the ED, she got a litre bolus of LR. WBC 7.5, Hb 10.1, Cr. 1.31    PAST MEDICAL & SURGICAL HISTORY:  Diverticulitis    HTN (hypertension)    DM (diabetes mellitus)      Home Meds: Home Medications:    Allergies: Allergies    Fish (Unknown)    Intolerances      Soc:   Advanced Directives: Presumed Full Code     CURRENT MEDICATIONS:   --------------------------------------------------------------------------------------  Neurologic Medications    Respiratory Medications    Cardiovascular Medications    Gastrointestinal Medications    Genitourinary Medications    Hematologic/Oncologic Medications    Antimicrobial/Immunologic Medications    Endocrine/Metabolic Medications    Topical/Other Medications    --------------------------------------------------------------------------------------    VITAL SIGNS, INS/OUTS (last 24 hours):  --------------------------------------------------------------------------------------  ICU Vital Signs Last 24 Hrs  T(C): 37.1 (26 Sep 2020 21:15), Max: 37.1 (26 Sep 2020 21:15)  T(F): 98.8 (26 Sep 2020 21:15), Max: 98.8 (26 Sep 2020 21:15)  HR: 66 (26 Sep 2020 21:15) (66 - 66)  BP: 176/91 (26 Sep 2020 21:15) (176/91 - 176/91)  BP(mean): --  ABP: --  ABP(mean): --  RR: 16 (26 Sep 2020 21:15) (16 - 16)  SpO2: 96% (26 Sep 2020 21:15) (96% - 96%)    I&O's Summary    --------------------------------------------------------------------------------------    Physical Exam:  General: NAD  Pulmonary: Nonlabored breathing, no respiratory distress  Cardiovascular: NSR  Abdominal: soft, mildly distended but non peritonitic.   Extremities: WWP, normal strength, no clubbing/cyanosis/edema  Neuro: A/O x3, no focal deficits, normal sensation  Pulses: palpable distal pulses    Rectal exam: small skin tags on the anus. Dark clots of blood on OLGA LIDIA. No stool.    LABS  --------------------------------------------------------------------------------------  Labs:  CAPILLARY BLOOD GLUCOSE                              10.1   7.55  )-----------( 273      ( 27 Sep 2020 02:09 )             32.1       Auto Neutrophil %: 49.3 % (09-27-20 @ 02:09)  Auto Immature Granulocyte %: 0.4 % (09-27-20 @ 02:09)  Auto Neutrophil %: 52.2 % (09-26-20 @ 22:08)  Auto Immature Granulocyte %: 0.3 % (09-26-20 @ 22:08)    09-26    141  |  102  |  36<H>  ----------------------------<  213<H>  4.9   |  29  |  1.31<H>      Calcium, Total Serum: 10.3 mg/dL (09-26-20 @ 22:08)      LFTs:             7.1  | 0.4  | 16       ------------------[93      ( 26 Sep 2020 22:08 )  3.9  | x    | 18          Lipase:x      Amylase:x             Coags:     11.3   ----< 0.94    ( 26 Sep 2020 22:08 )     27.4                      --------------------------------------------------------------------------------------    OTHER LABS    IMAGING RESULTS      EXAM: CT ANGIO ABD PELV (W)AW IC     PROCEDURE DATE: 09/26/2020         INTERPRETATION: CTA (CT ANGIOGRAPHY) of the abdomen and pelvis     INDICATION: Rectal bleed.     TECHNIQUE: CTA (CT ANGIOGRAPHY) of the abdomen and pelvis was performed. A preliminary scan was performed without intravenous contrast material. 120 cc of Optiray 350 intravenous contrast was then administered and images were obtained during the arterial phase, the enteric phase, and during a delayed phase. Multiplanar images were reconstructed on an independent work-station. Image postprocessing was performed including the production of axial, coronal, and sagittal multiplanar reformatted images and coronal maximum intensity projections (MIPs).     COMPARISON: None.     FINDINGS:   Images of the lower chest demonstrate mild cardiomegaly.     The liver is mildly enlarged. Multiple large radiopaque stones are seen in the gallbladder, the largest of which measures 3 cm. The pancreas, spleen, and adrenal glands are normal in appearance. There are several bilateral renal cysts, the largest of which measures 12 cm in the left upper pole. A 1 cm fat density lesion in the left lower pole is consistent with an angiomyolipoma. Several too small to characterize hypodensities also present in each kidney. No hydronephrosis or nephrolithiasis is visualized.     No abdominal aortic aneurysm is seen. The abdominal aorta is tortuous. Mild calcified and noncalcified aortoiliac plaques are noted. 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 No lymphadenopathy is seen.     There is no evidence of active gastrointestinal bleeding. There is wall thickening of the distal descending and proximal sigmoid colon which is present on all 4 phases. Severe descending and sigmoid colon diverticulosis is noted. Moderate colonic stool burden is seen up to the level of the splenic flexure. Fluid-filled appendiceal tip is dilated up to 1.2 cm. There is no abnormal wall thickening, periappendiceal inflammation, or abnormal enhancement to suggest appendicitis. The remainder of the appendix is normal caliber. No ascites is seen.     Images of the pelvis demonstrate a 3.2 cm right ovarian simple cyst. Uterus, left adnexa, and urinary bladder are within normal limits.     Evaluation of the osseous structures demonstrates degenerative changes of the spine, bilateral sacroiliac joints, and pubic symphysis. Trace anterolisthesis is seen at L5-S1.       IMPRESSION:     1. No active gastrointestinal bleed identified.     2. Wall thickening of the descending and proximal sigmoid colon may represent colitis versus underdistention.     3. Cystic dilatation of the distal appendix without evidence for acute appendicitis. Mucocele is not excluded. Nonemergent surgical consultation is suggested.               Thank you for the opportunity to participate in the care of this patient.     NESTOR GALEANA M.D., RADIOLOGY RESIDENT   This document has been electronically signed.   YANI GRANT M.D., ATTENDING RADIOLOGIST   This document has been electronically signed. Sep 27 2020 2:56AM

## 2020-09-28 ENCOUNTER — RESULT REVIEW (OUTPATIENT)
Age: 80
End: 2020-09-28

## 2020-09-28 LAB
ANION GAP SERPL CALC-SCNC: 10 MMOL/L — SIGNIFICANT CHANGE UP (ref 5–17)
BASOPHILS # BLD AUTO: 0.04 K/UL — SIGNIFICANT CHANGE UP (ref 0–0.2)
BASOPHILS # BLD AUTO: 0.05 K/UL — SIGNIFICANT CHANGE UP (ref 0–0.2)
BASOPHILS NFR BLD AUTO: 0.6 % — SIGNIFICANT CHANGE UP (ref 0–2)
BASOPHILS NFR BLD AUTO: 0.8 % — SIGNIFICANT CHANGE UP (ref 0–2)
BUN SERPL-MCNC: 17 MG/DL — SIGNIFICANT CHANGE UP (ref 7–23)
CALCIUM SERPL-MCNC: 9.1 MG/DL — SIGNIFICANT CHANGE UP (ref 8.4–10.5)
CHLORIDE SERPL-SCNC: 104 MMOL/L — SIGNIFICANT CHANGE UP (ref 96–108)
CO2 SERPL-SCNC: 28 MMOL/L — SIGNIFICANT CHANGE UP (ref 22–31)
CREAT SERPL-MCNC: 1.12 MG/DL — SIGNIFICANT CHANGE UP (ref 0.5–1.3)
EOSINOPHIL # BLD AUTO: 0.13 K/UL — SIGNIFICANT CHANGE UP (ref 0–0.5)
EOSINOPHIL # BLD AUTO: 0.24 K/UL — SIGNIFICANT CHANGE UP (ref 0–0.5)
EOSINOPHIL NFR BLD AUTO: 1.8 % — SIGNIFICANT CHANGE UP (ref 0–6)
EOSINOPHIL NFR BLD AUTO: 3.6 % — SIGNIFICANT CHANGE UP (ref 0–6)
GLUCOSE BLDC GLUCOMTR-MCNC: 138 MG/DL — HIGH (ref 70–99)
GLUCOSE SERPL-MCNC: 102 MG/DL — HIGH (ref 70–99)
HCT VFR BLD CALC: 31.1 % — LOW (ref 34.5–45)
HCT VFR BLD CALC: 34.2 % — LOW (ref 34.5–45)
HGB BLD-MCNC: 10.9 G/DL — LOW (ref 11.5–15.5)
HGB BLD-MCNC: 9.9 G/DL — LOW (ref 11.5–15.5)
IMM GRANULOCYTES NFR BLD AUTO: 0.3 % — SIGNIFICANT CHANGE UP (ref 0–1.5)
IMM GRANULOCYTES NFR BLD AUTO: 0.3 % — SIGNIFICANT CHANGE UP (ref 0–1.5)
LYMPHOCYTES # BLD AUTO: 1.64 K/UL — SIGNIFICANT CHANGE UP (ref 1–3.3)
LYMPHOCYTES # BLD AUTO: 2.35 K/UL — SIGNIFICANT CHANGE UP (ref 1–3.3)
LYMPHOCYTES # BLD AUTO: 23 % — SIGNIFICANT CHANGE UP (ref 13–44)
LYMPHOCYTES # BLD AUTO: 35.7 % — SIGNIFICANT CHANGE UP (ref 13–44)
MAGNESIUM SERPL-MCNC: 1.8 MG/DL — SIGNIFICANT CHANGE UP (ref 1.6–2.6)
MCHC RBC-ENTMCNC: 30.6 PG — SIGNIFICANT CHANGE UP (ref 27–34)
MCHC RBC-ENTMCNC: 30.6 PG — SIGNIFICANT CHANGE UP (ref 27–34)
MCHC RBC-ENTMCNC: 31.8 GM/DL — LOW (ref 32–36)
MCHC RBC-ENTMCNC: 31.9 GM/DL — LOW (ref 32–36)
MCV RBC AUTO: 96 FL — SIGNIFICANT CHANGE UP (ref 80–100)
MCV RBC AUTO: 96.1 FL — SIGNIFICANT CHANGE UP (ref 80–100)
MONOCYTES # BLD AUTO: 0.52 K/UL — SIGNIFICANT CHANGE UP (ref 0–0.9)
MONOCYTES # BLD AUTO: 0.66 K/UL — SIGNIFICANT CHANGE UP (ref 0–0.9)
MONOCYTES NFR BLD AUTO: 10 % — SIGNIFICANT CHANGE UP (ref 2–14)
MONOCYTES NFR BLD AUTO: 7.3 % — SIGNIFICANT CHANGE UP (ref 2–14)
NEUTROPHILS # BLD AUTO: 3.27 K/UL — SIGNIFICANT CHANGE UP (ref 1.8–7.4)
NEUTROPHILS # BLD AUTO: 4.78 K/UL — SIGNIFICANT CHANGE UP (ref 1.8–7.4)
NEUTROPHILS NFR BLD AUTO: 49.6 % — SIGNIFICANT CHANGE UP (ref 43–77)
NEUTROPHILS NFR BLD AUTO: 67 % — SIGNIFICANT CHANGE UP (ref 43–77)
NRBC # BLD: 0 /100 WBCS — SIGNIFICANT CHANGE UP (ref 0–0)
NRBC # BLD: 0 /100 WBCS — SIGNIFICANT CHANGE UP (ref 0–0)
PHOSPHATE SERPL-MCNC: 3.8 MG/DL — SIGNIFICANT CHANGE UP (ref 2.5–4.5)
PLATELET # BLD AUTO: 273 K/UL — SIGNIFICANT CHANGE UP (ref 150–400)
PLATELET # BLD AUTO: 328 K/UL — SIGNIFICANT CHANGE UP (ref 150–400)
POTASSIUM SERPL-MCNC: 4.6 MMOL/L — SIGNIFICANT CHANGE UP (ref 3.5–5.3)
POTASSIUM SERPL-SCNC: 4.6 MMOL/L — SIGNIFICANT CHANGE UP (ref 3.5–5.3)
RBC # BLD: 3.24 M/UL — LOW (ref 3.8–5.2)
RBC # BLD: 3.56 M/UL — LOW (ref 3.8–5.2)
RBC # FLD: 12.7 % — SIGNIFICANT CHANGE UP (ref 10.3–14.5)
RBC # FLD: 12.9 % — SIGNIFICANT CHANGE UP (ref 10.3–14.5)
SODIUM SERPL-SCNC: 142 MMOL/L — SIGNIFICANT CHANGE UP (ref 135–145)
TSH SERPL-MCNC: 0.41 UIU/ML — SIGNIFICANT CHANGE UP (ref 0.35–4.94)
WBC # BLD: 6.59 K/UL — SIGNIFICANT CHANGE UP (ref 3.8–10.5)
WBC # BLD: 7.13 K/UL — SIGNIFICANT CHANGE UP (ref 3.8–10.5)
WBC # FLD AUTO: 6.59 K/UL — SIGNIFICANT CHANGE UP (ref 3.8–10.5)
WBC # FLD AUTO: 7.13 K/UL — SIGNIFICANT CHANGE UP (ref 3.8–10.5)

## 2020-09-28 PROCEDURE — 88305 TISSUE EXAM BY PATHOLOGIST: CPT | Mod: 26

## 2020-09-28 PROCEDURE — 99233 SBSQ HOSP IP/OBS HIGH 50: CPT | Mod: GC

## 2020-09-28 PROCEDURE — 45378 DIAGNOSTIC COLONOSCOPY: CPT | Mod: GC

## 2020-09-28 RX ORDER — AMLODIPINE BESYLATE 2.5 MG/1
2.5 TABLET ORAL ONCE
Refills: 0 | Status: COMPLETED | OUTPATIENT
Start: 2020-09-28 | End: 2020-09-28

## 2020-09-28 RX ADMIN — Medication 125 MICROGRAM(S): at 05:45

## 2020-09-28 RX ADMIN — DONEPEZIL HYDROCHLORIDE 10 MILLIGRAM(S): 10 TABLET, FILM COATED ORAL at 21:27

## 2020-09-28 RX ADMIN — AMLODIPINE BESYLATE 2.5 MILLIGRAM(S): 2.5 TABLET ORAL at 07:32

## 2020-09-28 RX ADMIN — Medication 2000 MILLILITER(S): at 05:49

## 2020-09-28 NOTE — CHART NOTE - NSCHARTNOTEFT_GEN_A_CORE
GI Procedure Note:    Patient had an endoscopic evaluation today with the following findings:    Colonoscopy:    1. 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   2. Two small cecal polyps s/p cold forceps biopsies.   3. Severe diverticulosis in the sigmoid colon and few scattered diverticulae in the descending and transverse colon.   4. Active oozing of blood seen from one of the sigmoid colon diverticulum, which stopped bleeding spontaneously before any intervention could be performed.                   recommendation:	1. Follow up pathology  2. Monitor CBC  3. IR consulted from endo suite regarding the diverticular bleeding seen during colonoscopy. Please follow up with them in case of further rectal bleeding.   4. CLD for now     Please check paper chart for further details regarding procedure, findings, and interventions and recommendations. GI Procedure Note:    Patient had an endoscopic evaluation today with the following findings:    Colonoscopy:    1. 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   2. Two small cecal polyps s/p cold forceps biopsies.   3. Severe diverticulosis in the sigmoid colon and few scattered diverticula in the descending and transverse colon.   4. Active oozing of blood seen from one of the sigmoid colon diverticulum, which stopped bleeding spontaneously before any intervention could be performed.                   recommendation:	1. Follow up pathology  2. Monitor CBC  3. IR consulted from endo suite regarding the diverticular bleeding seen during colonoscopy. Please follow up with them in case of further rectal bleeding.   4. CLD for now     Please check paper chart for further details regarding procedure, findings, and interventions and recommendations.

## 2020-09-28 NOTE — PROGRESS NOTE ADULT - SUBJECTIVE AND OBJECTIVE BOX
NOTE IN PROGRESS    OVERNIGHT EVENTS:    SUBJECTIVE / INTERVAL HPI: Patient seen and examined at bedside. No fevers/chills, nausea, vomiting, diarrhea, abdominal pain, chest pain, shortness of breath.      VITAL SIGNS:  Vital Signs Last 24 Hrs  T(C): 36.5 (28 Sep 2020 05:05), Max: 36.7 (27 Sep 2020 07:42)  T(F): 97.7 (28 Sep 2020 05:05), Max: 98.1 (27 Sep 2020 07:42)  HR: 67 (28 Sep 2020 05:05) (53 - 67)  BP: 182/82 (28 Sep 2020 05:05) (131/73 - 182/82)  BP(mean): --  RR: 18 (28 Sep 2020 05:05) (18 - 18)  SpO2: 97% (28 Sep 2020 05:05) (92% - 97%)    PHYSICAL EXAM:    General: WDWN  HEENT: NC/AT; PERRL, anicteric sclera; MMM  Neck: supple  Cardiovascular: +S1/S2; RRR  Respiratory: CTA B/L; no W/R/R  Gastrointestinal: soft, NT/ND; +BSx4  Extremities: WWP; no edema, clubbing or cyanosis  Vascular: 2+ radial, DP/PT pulses B/L  Neurological: AAOx3; no focal deficits    MEDICATIONS:  MEDICATIONS  (STANDING):  donepezil 10 milliGRAM(s) Oral at bedtime  influenza  Vaccine (HIGH DOSE) 0.7 milliLiter(s) IntraMuscular once  levothyroxine 125 MICROGram(s) Oral daily    MEDICATIONS  (PRN):      ALLERGIES:  Allergies    Fish (Unknown)    Intolerances        LABS:                        10.7   7.65  )-----------( 312      ( 27 Sep 2020 22:39 )             33.9     09-27    142  |  106  |  27<H>  ----------------------------<  109<H>  4.5   |  26  |  1.25    Ca    9.0      27 Sep 2020 10:24  Mg     1.8     09-27    TPro  5.8<L>  /  Alb  3.5  /  TBili  0.5  /  DBili  x   /  AST  10  /  ALT  13  /  AlkPhos  80  09-27    PT/INR - ( 27 Sep 2020 10:24 )   PT: 11.4 sec;   INR: 0.95          PTT - ( 26 Sep 2020 22:08 )  PTT:27.4 sec    CAPILLARY BLOOD GLUCOSE      POCT Blood Glucose.: 109 mg/dL (27 Sep 2020 22:51)      RADIOLOGY & ADDITIONAL TESTS: Reviewed.   OVERNIGHT EVENTS: No acute events overnight. Early in AM paged for /80. Rechecked 15 min later and was persistently elevated, therefore prescribed amlodipine 2.5mg    SUBJECTIVE / INTERVAL HPI: Patient seen and examined at bedside. No complaints, said that she had one bowel movement and still had blood in it. No fevers/chills, nausea, vomiting, diarrhea, abdominal pain, chest pain, shortness of breath.      VITAL SIGNS:  Vital Signs Last 24 Hrs  T(C): 36.5 (28 Sep 2020 05:05), Max: 36.7 (27 Sep 2020 07:42)  T(F): 97.7 (28 Sep 2020 05:05), Max: 98.1 (27 Sep 2020 07:42)  HR: 67 (28 Sep 2020 05:05) (53 - 67)  BP: 182/82 (28 Sep 2020 05:05) (131/73 - 182/82)  BP(mean): --  RR: 18 (28 Sep 2020 05:05) (18 - 18)  SpO2: 97% (28 Sep 2020 05:05) (92% - 97%)    PHYSICAL EXAM:    General: WDWN  HEENT: NC/AT; MMM  Cardiovascular: +S1/S2; RRR  Respiratory: CTA B/L; no W/R/R  Gastrointestinal: soft, NT/ND; +BSx4  Extremities: WWP; no edema, clubbing or cyanosis  Vascular: 2+ radial, DP pulses B/L  Neurological: AAOx3; no focal deficits    MEDICATIONS:  MEDICATIONS  (STANDING):  donepezil 10 milliGRAM(s) Oral at bedtime  influenza  Vaccine (HIGH DOSE) 0.7 milliLiter(s) IntraMuscular once  levothyroxine 125 MICROGram(s) Oral daily    MEDICATIONS  (PRN):      ALLERGIES:  Allergies    Fish (Unknown)    Intolerances        LABS:                        10.7   7.65  )-----------( 312      ( 27 Sep 2020 22:39 )             33.9     09-27    142  |  106  |  27<H>  ----------------------------<  109<H>  4.5   |  26  |  1.25    Ca    9.0      27 Sep 2020 10:24  Mg     1.8     09-27    TPro  5.8<L>  /  Alb  3.5  /  TBili  0.5  /  DBili  x   /  AST  10  /  ALT  13  /  AlkPhos  80  09-27    PT/INR - ( 27 Sep 2020 10:24 )   PT: 11.4 sec;   INR: 0.95          PTT - ( 26 Sep 2020 22:08 )  PTT:27.4 sec    CAPILLARY BLOOD GLUCOSE      POCT Blood Glucose.: 109 mg/dL (27 Sep 2020 22:51)      RADIOLOGY & ADDITIONAL TESTS: Reviewed.

## 2020-09-28 NOTE — PROGRESS NOTE ADULT - SUBJECTIVE AND OBJECTIVE BOX
The patient had no acute issues overnight, but stated the after starting Coomuna for today's colonoscopy she had a bloody bowel movement that had some clots. Hgb dropped from 10.7 to 9.9    Denies, nausea, vomiting, abdominal pain. Fever or chills    AVSS    MEDICATIONS  (STANDING):  donepezil 10 milliGRAM(s) Oral at bedtime  influenza  Vaccine (HIGH DOSE) 0.7 milliLiter(s) IntraMuscular once  levothyroxine 125 MICROGram(s) Oral daily    MEDICATIONS  (PRN):      Vital Signs Last 24 Hrs  T(C): 37 (28 Sep 2020 12:46), Max: 37 (28 Sep 2020 12:46)  T(F): 98.6 (28 Sep 2020 12:46), Max: 98.6 (28 Sep 2020 12:46)  HR: 85 (28 Sep 2020 12:46) (53 - 85)  BP: 178/93 (28 Sep 2020 12:46) (131/73 - 182/82)  BP(mean): --  RR: 16 (28 Sep 2020 12:46) (16 - 18)  SpO2: 97% (28 Sep 2020 12:46) (92% - 97%)    Gen: NAD, resting comfortably in bed  Pulm: Good inspiratory effort, nonlabored breathing  C/V: NSR  Abd: Soft, non tender, nondistended. No rebound, no guarding.   Extrem: WWP, no edema                          9.9    6.59  )-----------( 273      ( 28 Sep 2020 07:16 )             31.1   09-28    142  |  104  |  17  ----------------------------<  102<H>  4.6   |  28  |  1.12    Ca    9.1      28 Sep 2020 07:16  Phos  3.8     09-28  Mg     1.8     09-28    TPro  5.8<L>  /  Alb  3.5  /  TBili  0.5  /  DBili  x   /  AST  10  /  ALT  13  /  AlkPhos  80  09-27

## 2020-09-28 NOTE — PROGRESS NOTE ADULT - PROBLEM SELECTOR PLAN 4
Continue home levothyroxine  - F/U AM TSH Continue home levothyroxine  - AM tsh wnl  -c/w home levothyroxine

## 2020-09-28 NOTE — PROGRESS NOTE ADULT - ASSESSMENT
A 79 y/o f hx HTN, DM, Hypothyroidism, Stroke and PSH for breast lumpectomy for benign mass and uterine prolapse. She presents to the ED today with c/o blood in her bowel movements over the past 2 days.  Last colonoscopy 20 years ago showing diverticuli in the colon. CTA AP shows no active bleeding.      The patient had  some bleeding with stools after starting Golytely hgb dropped from 10.7 to 9.9, AVSS  Denies, nausea vomiting or abdominal pain    Abdominal exam is benign.    Seen by GI yesterday and is planned for Colonoscopy today.          Plan discussed with Dr. Hyde and Chief resident:    - Pending C-scope today  - Surgery Team 4c will continue to FU.  - Please call us for any urgent changes

## 2020-09-28 NOTE — PROGRESS NOTE ADULT - PROBLEM SELECTOR PLAN 3
Consult GI in AM  - General surgery to follow; f/u recommendations - General surgery is following, however, they do not see indication for surgical intervention at this time and recommend scope with GI; f/u recommendations

## 2020-09-28 NOTE — PROGRESS NOTE ADULT - PROBLEM SELECTOR PLAN 5
Takes metformin 500 mg at home  - Gabapentin listed as home medication; unclear if designated for diabetic neuropathy  - F/U AM A1c Takes metformin 500 mg at home  - Gabapentin listed as home medication; unclear if designated for diabetic neuropathy  - A1C was 6.5

## 2020-09-28 NOTE — PROGRESS NOTE ADULT - PROBLEM SELECTOR PLAN 1
Rectal bleeding x 3 days and in increasing quantity. Hgb currently stable at 10.1  - Rectal exam showed brown stool with nola blood  - Surgery recommending q6 CBC and Colonoscopy  - CT read concerning for diverticulitis at descending and sigmoid colon with possible colitis at sigmoid colon  - Rectal bleeding x 3 days and in increasing quantity. Hgb 10.5-->10.7-->9.9  - Rectal exam showed brown stool with nola blood  - Surgery recommending q6 CBC and Colonoscopy  - CT read concerning for diverticulitis at descending and sigmoid colon with possible colitis at sigmoid colon  -GI will plan to scope later today, will f/u with them regarding findings

## 2020-09-29 ENCOUNTER — TRANSCRIPTION ENCOUNTER (OUTPATIENT)
Age: 80
End: 2020-09-29

## 2020-09-29 VITALS
SYSTOLIC BLOOD PRESSURE: 158 MMHG | OXYGEN SATURATION: 96 % | HEART RATE: 65 BPM | TEMPERATURE: 99 F | DIASTOLIC BLOOD PRESSURE: 76 MMHG | RESPIRATION RATE: 18 BRPM

## 2020-09-29 PROBLEM — E11.9 TYPE 2 DIABETES MELLITUS WITHOUT COMPLICATIONS: Chronic | Status: ACTIVE | Noted: 2020-09-26

## 2020-09-29 PROBLEM — I10 ESSENTIAL (PRIMARY) HYPERTENSION: Chronic | Status: ACTIVE | Noted: 2020-09-26

## 2020-09-29 PROBLEM — K57.92 DIVERTICULITIS OF INTESTINE, PART UNSPECIFIED, WITHOUT PERFORATION OR ABSCESS WITHOUT BLEEDING: Chronic | Status: ACTIVE | Noted: 2020-09-26

## 2020-09-29 PROBLEM — E03.9 HYPOTHYROIDISM, UNSPECIFIED: Chronic | Status: ACTIVE | Noted: 2020-09-27

## 2020-09-29 LAB
ALBUMIN SERPL ELPH-MCNC: 3.4 G/DL — SIGNIFICANT CHANGE UP (ref 3.3–5)
ALP SERPL-CCNC: 61 U/L — SIGNIFICANT CHANGE UP (ref 40–120)
ALT FLD-CCNC: 10 U/L — SIGNIFICANT CHANGE UP (ref 10–45)
ANION GAP SERPL CALC-SCNC: 9 MMOL/L — SIGNIFICANT CHANGE UP (ref 5–17)
AST SERPL-CCNC: 11 U/L — SIGNIFICANT CHANGE UP (ref 10–40)
BASOPHILS # BLD AUTO: 0.03 K/UL — SIGNIFICANT CHANGE UP (ref 0–0.2)
BASOPHILS NFR BLD AUTO: 0.5 % — SIGNIFICANT CHANGE UP (ref 0–2)
BILIRUB SERPL-MCNC: 0.8 MG/DL — SIGNIFICANT CHANGE UP (ref 0.2–1.2)
BUN SERPL-MCNC: 13 MG/DL — SIGNIFICANT CHANGE UP (ref 7–23)
CALCIUM SERPL-MCNC: 8.9 MG/DL — SIGNIFICANT CHANGE UP (ref 8.4–10.5)
CHLORIDE SERPL-SCNC: 104 MMOL/L — SIGNIFICANT CHANGE UP (ref 96–108)
CO2 SERPL-SCNC: 28 MMOL/L — SIGNIFICANT CHANGE UP (ref 22–31)
CREAT SERPL-MCNC: 1.04 MG/DL — SIGNIFICANT CHANGE UP (ref 0.5–1.3)
EOSINOPHIL # BLD AUTO: 0.21 K/UL — SIGNIFICANT CHANGE UP (ref 0–0.5)
EOSINOPHIL NFR BLD AUTO: 3.8 % — SIGNIFICANT CHANGE UP (ref 0–6)
GLUCOSE SERPL-MCNC: 97 MG/DL — SIGNIFICANT CHANGE UP (ref 70–99)
HCT VFR BLD CALC: 29.4 % — LOW (ref 34.5–45)
HGB BLD-MCNC: 9.5 G/DL — LOW (ref 11.5–15.5)
IMM GRANULOCYTES NFR BLD AUTO: 0.4 % — SIGNIFICANT CHANGE UP (ref 0–1.5)
LYMPHOCYTES # BLD AUTO: 2.13 K/UL — SIGNIFICANT CHANGE UP (ref 1–3.3)
LYMPHOCYTES # BLD AUTO: 38.1 % — SIGNIFICANT CHANGE UP (ref 13–44)
MAGNESIUM SERPL-MCNC: 1.8 MG/DL — SIGNIFICANT CHANGE UP (ref 1.6–2.6)
MCHC RBC-ENTMCNC: 31.3 PG — SIGNIFICANT CHANGE UP (ref 27–34)
MCHC RBC-ENTMCNC: 32.3 GM/DL — SIGNIFICANT CHANGE UP (ref 32–36)
MCV RBC AUTO: 96.7 FL — SIGNIFICANT CHANGE UP (ref 80–100)
MONOCYTES # BLD AUTO: 0.65 K/UL — SIGNIFICANT CHANGE UP (ref 0–0.9)
MONOCYTES NFR BLD AUTO: 11.6 % — SIGNIFICANT CHANGE UP (ref 2–14)
NEUTROPHILS # BLD AUTO: 2.55 K/UL — SIGNIFICANT CHANGE UP (ref 1.8–7.4)
NEUTROPHILS NFR BLD AUTO: 45.6 % — SIGNIFICANT CHANGE UP (ref 43–77)
NRBC # BLD: 0 /100 WBCS — SIGNIFICANT CHANGE UP (ref 0–0)
PHOSPHATE SERPL-MCNC: 4.2 MG/DL — SIGNIFICANT CHANGE UP (ref 2.5–4.5)
PLATELET # BLD AUTO: 288 K/UL — SIGNIFICANT CHANGE UP (ref 150–400)
POTASSIUM SERPL-MCNC: 3.8 MMOL/L — SIGNIFICANT CHANGE UP (ref 3.5–5.3)
POTASSIUM SERPL-SCNC: 3.8 MMOL/L — SIGNIFICANT CHANGE UP (ref 3.5–5.3)
PROT SERPL-MCNC: 5.8 G/DL — LOW (ref 6–8.3)
RBC # BLD: 3.04 M/UL — LOW (ref 3.8–5.2)
RBC # FLD: 13 % — SIGNIFICANT CHANGE UP (ref 10.3–14.5)
SODIUM SERPL-SCNC: 141 MMOL/L — SIGNIFICANT CHANGE UP (ref 135–145)
SURGICAL PATHOLOGY STUDY: SIGNIFICANT CHANGE UP
WBC # BLD: 5.59 K/UL — SIGNIFICANT CHANGE UP (ref 3.8–10.5)
WBC # FLD AUTO: 5.59 K/UL — SIGNIFICANT CHANGE UP (ref 3.8–10.5)

## 2020-09-29 PROCEDURE — 85025 COMPLETE CBC W/AUTO DIFF WBC: CPT

## 2020-09-29 PROCEDURE — 99285 EMERGENCY DEPT VISIT HI MDM: CPT | Mod: 25

## 2020-09-29 PROCEDURE — 83036 HEMOGLOBIN GLYCOSYLATED A1C: CPT

## 2020-09-29 PROCEDURE — 80048 BASIC METABOLIC PNL TOTAL CA: CPT

## 2020-09-29 PROCEDURE — 88305 TISSUE EXAM BY PATHOLOGIST: CPT

## 2020-09-29 PROCEDURE — 85027 COMPLETE CBC AUTOMATED: CPT

## 2020-09-29 PROCEDURE — 84443 ASSAY THYROID STIM HORMONE: CPT

## 2020-09-29 PROCEDURE — 83735 ASSAY OF MAGNESIUM: CPT

## 2020-09-29 PROCEDURE — 99232 SBSQ HOSP IP/OBS MODERATE 35: CPT | Mod: GC

## 2020-09-29 PROCEDURE — 93005 ELECTROCARDIOGRAM TRACING: CPT

## 2020-09-29 PROCEDURE — U0003: CPT

## 2020-09-29 PROCEDURE — 36415 COLL VENOUS BLD VENIPUNCTURE: CPT

## 2020-09-29 PROCEDURE — 86900 BLOOD TYPING SEROLOGIC ABO: CPT

## 2020-09-29 PROCEDURE — 86850 RBC ANTIBODY SCREEN: CPT

## 2020-09-29 PROCEDURE — 99239 HOSP IP/OBS DSCHRG MGMT >30: CPT

## 2020-09-29 PROCEDURE — 82962 GLUCOSE BLOOD TEST: CPT

## 2020-09-29 PROCEDURE — 84100 ASSAY OF PHOSPHORUS: CPT

## 2020-09-29 PROCEDURE — 80053 COMPREHEN METABOLIC PANEL: CPT

## 2020-09-29 PROCEDURE — 85610 PROTHROMBIN TIME: CPT

## 2020-09-29 PROCEDURE — 85730 THROMBOPLASTIN TIME PARTIAL: CPT

## 2020-09-29 PROCEDURE — 86901 BLOOD TYPING SEROLOGIC RH(D): CPT

## 2020-09-29 PROCEDURE — 74174 CTA ABD&PLVS W/CONTRAST: CPT

## 2020-09-29 RX ADMIN — Medication 125 MICROGRAM(S): at 05:54

## 2020-09-29 NOTE — DISCHARGE NOTE NURSING/CASE MANAGEMENT/SOCIAL WORK - NSDCFUADDAPPT_GEN_ALL_CORE_FT
Please follow up with your Gastroenterology Provider, Dr. Maya Mendes at [address] [place] [address], [address], [place] [address] on 10/26/2020 at 11:00am.  Please note that the Office of Dr. Maya Mendes will contact you for an earlier appointment date once it becomes available.    Appointment was scheduled by Ms. NORIS Meza, Referral Coordinator.

## 2020-09-29 NOTE — PROGRESS NOTE ADULT - ASSESSMENT
A 81 y/o f hx HTN, DM, Hypothyroidism, Stroke and PSH for breast lumpectomy for benign mass and uterine prolapse. She presents to the ED today with c/o blood in her bowel movements over the past 2 days.  Last colonoscopy 20 years ago showing diverticuli in the colon. CTA AP shows no active bleeding.    Colonoscopy was done yesterday and showed    1. 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   2. Two small cecal polyps s/p cold forceps biopsies.   3. Severe diverticulosis in the sigmoid colon and few scattered diverticula in the descending and transverse colon.   4. Active oozing of blood seen from one of the sigmoid colon diverticulum, which stopped bleeding spontaneously before any intervention could be performed.    No abdominal pain, this am, no rectal bleeding, Hgb between 9.5 and 10.9, AVSS, afebrile    Discussed with Dr. Hyde and Chief resident:    - if further bleeding, we recommend sigmoidoscopy  - FU pathology  - Surgery will continue to FU

## 2020-09-29 NOTE — DISCHARGE NOTE PROVIDER - PROVIDER TOKENS
PROVIDER:[TOKEN:[[id number]]] FREE:[LAST:[Cinthya],FIRST:[Maya BULNT],PHONE:[phone]:[phone]],ADDRESS:[GASTROENTEROLOGY  [address] [place] [address]],SCHEDULEDAPPT:[10/26/2020],SCHEDULEDAPPTTIME:[11:00 AM]]

## 2020-09-29 NOTE — DISCHARGE NOTE NURSING/CASE MANAGEMENT/SOCIAL WORK - PATIENT PORTAL LINK FT
You can access the FollowMyHealth Patient Portal offered by [facility] by registering at the following website: [email or web address]. By joining [facility]’s FollowMyHealth portal, you will also be able to view your health information using other applications (apps) compatible with our system.

## 2020-09-29 NOTE — DISCHARGE NOTE PROVIDER - NSDCMRMEDTOKEN_GEN_ALL_CORE_FT
atenolol 100 mg oral tablet: 1 tab(s) orally once a day  donepezil 10 mg oral tablet: 1 tab(s) orally once a day (at bedtime)  gabapentin 300 mg oral tablet: 300 milligram(s) orally once a day  levothyroxine 125 mcg (0.125 mg) oral tablet: 1 tab(s) orally once a day  metFORMIN 500 mg oral tablet: 1 tab(s) orally 2 times a day

## 2020-09-29 NOTE — PROGRESS NOTE ADULT - SUBJECTIVE AND OBJECTIVE BOX
Patient had no issues overnight, underwent colonoscopy yesterday, showed severe sigmoid diverticulosis and  oozing from diverticuli that stopped without any intervention.  2 cecal polyps were found and removed.    Reported no rectal bleeding during night, just some spotting on tissues at the bathroom.  Hgb is between 9.5 and 10.9, afebrile, AVSS,  denies abdominal pain, nausea or vomiting.    MEDICATIONS  (STANDING):  donepezil 10 milliGRAM(s) Oral at bedtime  influenza  Vaccine (HIGH DOSE) 0.7 milliLiter(s) IntraMuscular once  levothyroxine 125 MICROGram(s) Oral daily    Vital Signs Last 24 Hrs  T(C): 36.8 (29 Sep 2020 06:16), Max: 37 (28 Sep 2020 12:46)  T(F): 98.3 (29 Sep 2020 06:16), Max: 98.6 (28 Sep 2020 12:46)  HR: 73 (29 Sep 2020 06:16) (73 - 86)  BP: 149/75 (29 Sep 2020 06:16) (132/68 - 178/93)  BP(mean): --  RR: 17 (29 Sep 2020 06:16) (16 - 18)  SpO2: 95% (29 Sep 2020 06:16) (95% - 98%)    Gen: NAD, resting comfortably in bed  Pulm: Good inspiratory effort, nonlabored breathing  C/V: NSR  Abd: Soft,non tender, nondistended. No rebound, no guarding.   Extrem: WWP, no edema                          9.5    5.59  )-----------( 288      ( 29 Sep 2020 07:18 )             29.4   09-29    141  |  104  |  13  ----------------------------<  97  3.8   |  28  |  1.04    Ca    8.9      29 Sep 2020 07:18  Phos  4.2     09-29  Mg     1.8     09-29    TPro  5.8<L>  /  Alb  3.4  /  TBili  0.8  /  DBili  x   /  AST  11  /  ALT  10  /  AlkPhos  61  09-29

## 2020-09-29 NOTE — DISCHARGE NOTE PROVIDER - CARE PROVIDER_API CALL
Maya Mendes)  Med Specialties at [address] [place] [address] [address]  [place], [place] [address]  Phone: [phone]  Fax: [phone]  Follow Up Time:    Maya Mendes  GASTROENTEROLOGY  [address], [address] [place] [address]  Phone: [phone]  Fax: [phone]  Scheduled Appointment: 10/26/2020 11:00 AM

## 2020-09-29 NOTE — DISCHARGE NOTE PROVIDER - HOSPITAL COURSE
#Discharge: do not delete    Ms. Prather is a 80 year old female with a past medical history of hypothyroidism, HTN and DM who presents with rectal bleeding x 3 days. She was admitted for rectal bleeding likely due to diverticulitis and had scope with GI yesterday showing some oozing diverticuli that stopped bleeding with no epinephrine/clipping or intervention. Monitored patient overnight for vitals and hemoglobin and she remained stable. Will follow-up wit hGI as outpatient.    Problem List/Main Diagnoses (system-based):     1. Rectal bleeding: x 3 days and in increasing quantity.  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 Hypothyroidism: no change in regimen, continue home levothyroxine    3. Type 2 diabetes mellitus with diabetic polyneuropathy: Continue metformin 500 mg at home    4: Hypertension: No change, atenolol 100 mg daily at home    Inpatient treatment course: Monitor vitals, IVF, CT Angio, Colonoscopy      New medications: None     Labs to be followed outpatient: CBC (make sure hgb stable)    Exam to be followed outpatient: None

## 2020-09-29 NOTE — PROGRESS NOTE ADULT - ASSESSMENT
80 F PMH HTN and DM who presents with rectal bleeding x 3 days. Initially started as blood in stools and progressed to nola BRBPR. Last rectal bleeding last night with no episodes today so far. Last c'scopy several years back with no details available.  Hgb of 11.8-->10.6. CT Angio showed wall thickening of the descending and proximal sigmoid colon.    # BRBPR  Colonoscopy on 9/28/20:    1. 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   2. Two small cecal polyps s/p cold forceps biopsies.   3. Severe diverticulosis in the sigmoid colon and few scattered diverticula in the descending and transverse colon.   4. Active oozing of blood seen from one of the sigmoid colon diverticulum, which stopped bleeding spontaneously before any intervention could be performed.               1. Follow up pathology  2. Monitor CBC  3. advance diet  4. Upon discharge patient should follow-up in the GI clinic on [address] (4th floor) with Dr. Mendes; Ph: [phone]    Recommendations discussed with primary team  Plan discussed with GI service attending    Kirt Cotton MD  PGY-4 GI fellow  Pager: [phone]       80 F PMH HTN and DM who presents with rectal bleeding x 3 days. Initially started as blood in stools and progressed to nola BRBPR. Last rectal bleeding last night with no episodes today so far. Last c'scopy several years back with no details available.  Hgb of 11.8-->10.6. CT Angio showed wall thickening of the descending and proximal sigmoid colon.    # BRBPR  Colonoscopy on 9/28/20:    1. 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   2. Two small cecal polyps s/p cold forceps biopsies.   3. Severe diverticulosis in the sigmoid colon and few scattered diverticula in the descending and transverse colon.   4. Active oozing of blood seen from one of the sigmoid colon diverticulum, which stopped bleeding spontaneously before any intervention could be performed.               1. Pathology from cecal polyp: mild active chronic inflammation negative for dyplasia likely non-specific findings  2. Monitor CBC  3. advance diet  4. Upon discharge patient should follow-up in the GI clinic on [address] (4th floor) with Dr. Mendes; Ph: [phone]    Recommendations discussed with primary team  Plan discussed with GI service attending    Kirt Cotton MD  PGY-4 GI fellow  Pager: [phone]

## 2020-09-29 NOTE — DISCHARGE NOTE PROVIDER - NSDCCPCAREPLAN_GEN_ALL_CORE_FT
PRINCIPAL DISCHARGE DIAGNOSIS  Diagnosis: Rectal bleeding  Assessment and Plan of Treatment: Pensamos que usted tiene la diverticulitis, que es inflamación o infección en karen o más bolsas pequeñas del tracto digestivo.  La diverticulitis es más común después de los 40 años.  Los síntomas incluyen dolor abdominal, fiebre, náuseas y un cambio en los hábitos intestinales.  El tratamiento puede incluir reposo, karen dieta líquida o baja en fibra y antibióticos. Los casos graves pueden necesitar atención hospitalaria y cirugía. Los médicos pueden recomendar karen dieta francesco en fibra después de la recuperación para prevenir episodios futuros. Es important que usted ir a karen rosita con el gastroenterologo en los proximos dos semanas.      SECONDARY DISCHARGE DIAGNOSES  Diagnosis: Type 2 diabetes mellitus with diabetic polyneuropathy, without long-term current use of insulin  Assessment and Plan of Treatment: Usted tiene diabetes, que es karen condicion cuando el nivel de azucar en la donte puede ser elevada. Es important que usted yoon mckenna metformin cada urvashi.    Diagnosis: Hypertension, unspecified type  Assessment and Plan of Treatment: Usted tiene hypertension que es karen condicion cuando la presion sera francesco. Es importante que usted yoon mckenna medicamiento para esta condicion.    Diagnosis: Cholelithiases  Assessment and Plan of Treatment:     Diagnosis: Type 2 diabetes mellitus with diabetic polyneuropathy, without long-term current use of insulin  Assessment and Plan of Treatment: Type 2 diabetes mellitus with diabetic polyneuropathy, without long-term current use of insulin    Diagnosis: Renal cyst  Assessment and Plan of Treatment:

## 2020-09-29 NOTE — DISCHARGE NOTE PROVIDER - NSDCFUADDAPPT_GEN_ALL_CORE_FT
Please follow up with your Gastroenterology Provider, Dr. Maya Mendes at [address] [place] [address], [address], [place] [address] on 10/26/2020 at 11:00am.  Please note that the Office of Dr. Maya Mendes will contact you for an earlier appointment date once it becomes available.    Appointment was scheduled by Ms. NORIS Meza, Referral Coordinator.

## 2020-09-29 NOTE — PROGRESS NOTE ADULT - SUBJECTIVE AND OBJECTIVE BOX
GASTROENTEROLOGY PROGRESS NOTE  Patient seen and examined at bedside. Patient reported no further rectal bleeding. Hb is stable between 9-10    PERTINENT REVIEW OF SYSTEMS:  As noted above    Allergies    Fish (Unknown)    Intolerances      MEDICATIONS:  MEDICATIONS  (STANDING):  donepezil 10 milliGRAM(s) Oral at bedtime  influenza  Vaccine (HIGH DOSE) 0.7 milliLiter(s) IntraMuscular once  levothyroxine 125 MICROGram(s) Oral daily    MEDICATIONS  (PRN):    Vital Signs Last 24 Hrs  T(C): 36.8 (29 Sep 2020 06:16), Max: 37 (28 Sep 2020 12:46)  T(F): 98.3 (29 Sep 2020 06:16), Max: 98.6 (28 Sep 2020 12:46)  HR: 73 (29 Sep 2020 06:16) (73 - 86)  BP: 149/75 (29 Sep 2020 06:16) (132/68 - 178/93)  BP(mean): --  RR: 17 (29 Sep 2020 06:16) (16 - 18)  SpO2: 95% (29 Sep 2020 06:16) (95% - 98%)    PHYSICAL EXAM:    General: Well developed; well nourished; in no acute distress  HEENT: MMM, conjunctiva and sclera clear  Gastrointestinal: Soft non-tender non-distended; Normal bowel sounds; No hepatosplenomegaly. No rebound or guarding  Skin: Warm and dry. No obvious rash    LABS:                        9.5    5.59  )-----------( 288      ( 29 Sep 2020 07:18 )             29.4     09-29    141  |  104  |  13  ----------------------------<  97  3.8   |  28  |  1.04    Ca    8.9      29 Sep 2020 07:18  Phos  4.2     09-29  Mg     1.8     09-29    TPro  5.8<L>  /  Alb  3.4  /  TBili  0.8  /  DBili  x   /  AST  11  /  ALT  10  /  AlkPhos  61  09-29    PT/INR - ( 27 Sep 2020 10:24 )   PT: 11.4 sec;   INR: 0.95                            RADIOLOGY & ADDITIONAL STUDIES:  Reviewed

## 2020-09-29 NOTE — PROGRESS NOTE ADULT - ATTENDING COMMENTS
Colonoscopy with diverticulosis of sigmoid colon, one was bleeding but stopped. No therapeutic measures.  Abd benign  HD stable  Hgb stable.    A/P: Slow or self-limited lower GI bleeding due to diverticulosis.    1. Ok to feed from my standpoint.  2. If re-bleeds, repeat sigmoidoscopy or angiogram.
No further bleeding  Eating today  will sign off.
Pt still with bloody BM with clots present overnight and this AM after drinking prep  For GI c-scope today, will f/u recs pending scope findings  Otherwise plan as above

## 2020-09-30 PROBLEM — Z00.00 ENCOUNTER FOR PREVENTIVE HEALTH EXAMINATION: Status: ACTIVE | Noted: 2020-09-30

## 2020-10-05 DIAGNOSIS — E11.22 TYPE 2 DIABETES MELLITUS WITH DIABETIC CHRONIC KIDNEY DISEASE: ICD-10-CM

## 2020-10-05 DIAGNOSIS — N18.3 CHRONIC KIDNEY DISEASE, STAGE 3 (MODERATE): ICD-10-CM

## 2020-10-05 DIAGNOSIS — K57.31 DIVERTICULOSIS OF LARGE INTESTINE WITHOUT PERFORATION OR ABSCESS WITH BLEEDING: ICD-10-CM

## 2020-10-05 DIAGNOSIS — Z91.013 ALLERGY TO SEAFOOD: ICD-10-CM

## 2020-10-05 DIAGNOSIS — E11.42 TYPE 2 DIABETES MELLITUS WITH DIABETIC POLYNEUROPATHY: ICD-10-CM

## 2020-10-05 DIAGNOSIS — K80.20 CALCULUS OF GALLBLADDER WITHOUT CHOLECYSTITIS WITHOUT OBSTRUCTION: ICD-10-CM

## 2020-10-05 DIAGNOSIS — Z98.890 OTHER SPECIFIED POSTPROCEDURAL STATES: ICD-10-CM

## 2020-10-05 DIAGNOSIS — Z79.899 OTHER LONG TERM (CURRENT) DRUG THERAPY: ICD-10-CM

## 2020-10-05 DIAGNOSIS — N28.1 CYST OF KIDNEY, ACQUIRED: ICD-10-CM

## 2020-10-05 DIAGNOSIS — D62 ACUTE POSTHEMORRHAGIC ANEMIA: ICD-10-CM

## 2020-10-05 DIAGNOSIS — I12.9 HYPERTENSIVE CHRONIC KIDNEY DISEASE WITH STAGE 1 THROUGH STAGE 4 CHRONIC KIDNEY DISEASE, OR UNSPECIFIED CHRONIC KIDNEY DISEASE: ICD-10-CM

## 2020-10-05 DIAGNOSIS — E03.9 HYPOTHYROIDISM, UNSPECIFIED: ICD-10-CM

## 2020-10-26 ENCOUNTER — LABORATORY RESULT (OUTPATIENT)
Age: 80
End: 2020-10-26

## 2020-10-26 ENCOUNTER — APPOINTMENT (OUTPATIENT)
Dept: GASTROENTEROLOGY | Facility: CLINIC | Age: 80
End: 2020-10-26
Payer: MEDICARE

## 2020-10-26 VITALS
SYSTOLIC BLOOD PRESSURE: 179 MMHG | RESPIRATION RATE: 15 BRPM | WEIGHT: 188 LBS | HEART RATE: 58 BPM | DIASTOLIC BLOOD PRESSURE: 79 MMHG | TEMPERATURE: 97.3 F | OXYGEN SATURATION: 98 %

## 2020-10-26 DIAGNOSIS — R10.9 UNSPECIFIED ABDOMINAL PAIN: ICD-10-CM

## 2020-10-26 PROCEDURE — 36415 COLL VENOUS BLD VENIPUNCTURE: CPT

## 2020-10-26 PROCEDURE — 99072 ADDL SUPL MATRL&STAF TM PHE: CPT

## 2020-10-26 PROCEDURE — 99204 OFFICE O/P NEW MOD 45 MIN: CPT | Mod: 25

## 2020-10-26 RX ORDER — SIMETHICONE 125 MG
125 CAPSULE ORAL 4 TIMES DAILY
Qty: 120 | Refills: 3 | Status: ACTIVE | COMMUNITY
Start: 2020-10-26 | End: 1900-01-01

## 2020-10-27 LAB
ALBUMIN SERPL ELPH-MCNC: 4.1 G/DL
ALP BLD-CCNC: 82 U/L
ALT SERPL-CCNC: 9 U/L
ANION GAP SERPL CALC-SCNC: 10 MMOL/L
AST SERPL-CCNC: 13 U/L
BASOPHILS # BLD AUTO: 0.04 K/UL
BASOPHILS NFR BLD AUTO: 0.8 %
BILIRUB SERPL-MCNC: 0.7 MG/DL
BUN SERPL-MCNC: 17 MG/DL
CALCIUM SERPL-MCNC: 9.5 MG/DL
CANCER AG125 SERPL-ACNC: 8 U/ML
CANCER AG19-9 SERPL-ACNC: 9 U/ML
CEA SERPL-MCNC: 3.7 NG/ML
CHLORIDE SERPL-SCNC: 104 MMOL/L
CO2 SERPL-SCNC: 26 MMOL/L
CREAT SERPL-MCNC: 1.24 MG/DL
EOSINOPHIL # BLD AUTO: 0.17 K/UL
EOSINOPHIL NFR BLD AUTO: 3.4 %
GLUCOSE SERPL-MCNC: 93 MG/DL
HCT VFR BLD CALC: 33.6 %
HGB BLD-MCNC: 10.2 G/DL
IMM GRANULOCYTES NFR BLD AUTO: 0.2 %
LPL SERPL-CCNC: 35 U/L
LYMPHOCYTES # BLD AUTO: 1.54 K/UL
LYMPHOCYTES NFR BLD AUTO: 30.6 %
MAN DIFF?: NORMAL
MCHC RBC-ENTMCNC: 30.4 GM/DL
MCHC RBC-ENTMCNC: 31.1 PG
MCV RBC AUTO: 102.4 FL
MONOCYTES # BLD AUTO: 0.39 K/UL
MONOCYTES NFR BLD AUTO: 7.8 %
NEUTROPHILS # BLD AUTO: 2.88 K/UL
NEUTROPHILS NFR BLD AUTO: 57.2 %
PLATELET # BLD AUTO: 305 K/UL
POTASSIUM SERPL-SCNC: 4.8 MMOL/L
PROT SERPL-MCNC: 6.5 G/DL
RBC # BLD: 3.28 M/UL
RBC # FLD: 13.7 %
SODIUM SERPL-SCNC: 141 MMOL/L
WBC # FLD AUTO: 5.03 K/UL

## 2020-10-27 NOTE — REVIEW OF SYSTEMS
[Negative] : Heme/Lymph [Abdominal Pain] : abdominal pain [Vomiting] : no vomiting [Constipation] : no constipation [Diarrhea] : no diarrhea [Heartburn] : no heartburn [Melena] : no melena [FreeTextEntry7] : gas

## 2020-10-27 NOTE — ASSESSMENT
[FreeTextEntry1] : 79 yo F PMH DM, HTN, hypothyroidism, and diverticulosis who presents for follow up after recent hospitalization. Patient is also requesting to go over report from inpatient colonoscopy as well as new complaints of abdominal discomfort from gassiness and bloating. \par \par Rectal bleeding, suspect diverticulosis \par - Prevent constipation \par - we discussed there are no dietary restrictions\par - Screening CRC - above age 75, does not need screening (and we discussed does not need repeat colon at this time, unless develops any new symptoms or has recurrent GIB)\par \par Appendiceal mucocele\par - refer to general surgery for evaluation\par - check tumor markers (CA 19-9, CEA, )\par - has a history per patient of having an infection near the appendix and required drainage? Given this background will refer to surgery for evaluation for resection\par \par Gas\par - avoid dairy \par - FODMAP diet to be discussed at the next visit\par - I asked her to keep a food and symptom diary\par - consider SIBO testing (however this is involved and patient does not have any English speakers at home to assist her\par \par Epigastric and L sided pain, patient expresses concern about "pancreas"\par - check lipase\par - CBC, BMP\par - consider abdominal ultrasound to assess biliary causes (we discussed that inpatient scans without any biliary obstruction and normal pancreas)\par - consider endoscopy (patient does not have anyone to drive her home from the hospital), if we are able to do EGD in the future, can check for disaccharidase testing\par \par Follow up 2-3 months

## 2020-10-27 NOTE — HISTORY OF PRESENT ILLNESS
[Heartburn] : denies heartburn [Nausea] : denies nausea [Vomiting] : denies vomiting [Diarrhea] : denies diarrhea [Constipation] : denies constipation [Yellow Skin Or Eyes (Jaundice)] : denies jaundice [Abdominal Pain] : denies abdominal pain [Abdominal Swelling] : denies abdominal swelling [de-identified] : 79 yo F PMH DM, HTN, hypothyroidism, and diverticulosis who presents for follow up after recent hospitalization. Patient is also requesting to go over report from inpatient colonoscopy as well as new complaints of abdominal discomfort from gassiness and bloating. \par \par Underwent colonoscopy as inpatient for evaluation of rectal bleeding. Diverticulosis. \par She is hungry. Fasting because not sure. \par Since leaving the hospital on 9/29 after rectal bleeding x 10 days. She has not had any rectal bleeding since leaving the hospital. \par \par No diarrhea, constipation. \par \par She reports having significant abdominal bloating and gas. She has it all the time. Worse when she eats and no matter what she eats. She also has some intermittent L sided pain, but cannot identify triggers. \par \par She had some issue with infection of appendix and intestine and had a clean out. Small incision on the R side and a drain to drain the infection. \par \par PMH: \par DM \par HTN\par Hypothyroidism\par diverticulosis\par \par PSH: \par Took out a ball from the breast\par drain near the appendix\par \par FH: \par Denies FH of GI malignancies\par \par SH: \par No smoking\par No EtOH\par No Illicit drug use\par \par MED:\par metformin\par atenolol\par gabapentin\par levothyroxine\par donepezile\par ferrous sulfate \par \par ALL:  NKDA\par \par

## 2020-10-27 NOTE — PHYSICAL EXAM
[General Appearance - Alert] : alert [General Appearance - In No Acute Distress] : in no acute distress [General Appearance - Well Nourished] : well nourished [General Appearance - Well Developed] : well developed [General Appearance - Well-Appearing] : healthy appearing [Sclera] : the sclera and conjunctiva were normal [PERRL With Normal Accommodation] : pupils were equal in size, round, and reactive to light [Extraocular Movements] : extraocular movements were intact [Outer Ear] : the ears and nose were normal in appearance [Examination Of The Oral Cavity] : the lips and gums were normal [Both Tympanic Membranes Were Examined] : both tympanic membranes were normal [Neck Appearance] : the appearance of the neck was normal [Neck Cervical Mass (___cm)] : no neck mass was observed [Respiration, Rhythm And Depth] : normal respiratory rhythm and effort [Exaggerated Use Of Accessory Muscles For Inspiration] : no accessory muscle use [Heart Rate And Rhythm] : heart rate was normal and rhythm regular [Heart Sounds] : normal S1 and S2 [Murmurs] : no murmurs [Edema] : there was no peripheral edema [Bowel Sounds] : normal bowel sounds [Abdomen Soft] : soft [Abdomen Tenderness] : non-tender [Abdomen Mass (___ Cm)] : no abdominal mass palpated [FreeTextEntry1] : obese abdomen [Cervical Lymph Nodes Enlarged Posterior Bilaterally] : posterior cervical [Cervical Lymph Nodes Enlarged Anterior Bilaterally] : anterior cervical [No CVA Tenderness] : no ~M costovertebral angle tenderness [Abnormal Walk] : normal gait [Nail Clubbing] : no clubbing  or cyanosis of the fingernails [Musculoskeletal - Swelling] : no joint swelling seen [Skin Color & Pigmentation] : normal skin color and pigmentation [Skin Turgor] : normal skin turgor [] : no rash [No Focal Deficits] : no focal deficits [Oriented To Time, Place, And Person] : oriented to person, place, and time [Impaired Insight] : insight and judgment were intact [Affect] : the affect was normal [Mood] : the mood was normal [Memory Recent] : recent memory was not impaired

## 2020-11-02 LAB
CELIAC DISEASE INTERPRETATION: NORMAL
CELIAC GENE PAIRS PRESENT: NO
DQ ALPHA 1: NORMAL
DQ BETA 1: NORMAL
IMMUNOGLOBULIN A (IGA): 221 MG/DL

## 2020-12-07 ENCOUNTER — APPOINTMENT (OUTPATIENT)
Dept: GASTROENTEROLOGY | Facility: CLINIC | Age: 80
End: 2020-12-07

## 2023-09-13 NOTE — ED PROVIDER NOTE - NS ED MD DISPO SPECIAL CONSIDERATION1
How Severe Are Your Spot(S)?: mild What Is The Reason For Today's Visit?: Full Body Skin Examination What Is The Reason For Today's Visit? (Being Monitored For X): concerning skin lesions on an annual basis None

## 2024-02-22 NOTE — ED ADULT TRIAGE NOTE - PAIN RATING/NUMBER SCALE (0-10): ACTIVITY
Clinic Consult:  [facility] Consultation Note    Reason for Consult:  The primary encounter diagnosis was Colon cancer screening. A diagnosis of Lower abdominal pain was also pertinent to this visit.    PCP: No, Primary Doctor   No address on file    HPI:  This is a 48 y.o. female here for evaluation of colon cancer screening.   Prior colonoscopy?: no  Date of last colonoscopy: NA  History of colon polyps: NA  Recall: NA  Family history of colon cancer: no  Abdominal pain, hematochezia, melena, nausea, vomiting, or weight loss: yes- chronic abdominal pain/cramping. She does feel rumbling in her abdomen. Occasional slight rectal bleeding with bright red blood.     Review of Systems   Constitutional:  Negative for fever and weight loss.   HENT:  Negative for sore throat.    Respiratory:  Negative for cough, shortness of breath and wheezing.    Cardiovascular:  Negative for chest pain and palpitations.   Gastrointestinal:  Positive for abdominal pain. Negative for blood in stool, constipation, diarrhea, heartburn, melena, nausea and vomiting.        Rectal bleeding    Genitourinary:  Negative for dysuria and frequency.   Skin:  Negative for itching and rash.   Neurological:  Negative for dizziness, speech change, seizures, loss of consciousness and headaches.       Medical History:  has a past medical history of Fibroids, Hypertension, Migraine, and Prediabetes.    Surgical History: none    Family History: family history includes Hypertension in her mother..     Social History:  reports that she has never smoked. She has never used smokeless tobacco.    Allergies: Reviewed    Home Medications:   Current Outpatient Medications on File Prior to Visit   Medication Sig Dispense Refill    amitriptyline (ELAVIL) 10 MG tablet Take 10 mg by mouth.      ergocalciferol (ERGOCALCIFEROL) 50,000 unit Cap       EScitalopram oxalate (LEXAPRO) 10 MG tablet 10 mg.      ferrous sulfate (FEOSOL) 325 mg (65 mg iron) Tab  "tablet 325 mg.      fluticasone propionate (FLONASE) 50 mcg/actuation nasal spray 2 sprays by Each Nostril route once daily.      methocarbamoL (ROBAXIN) 750 MG Tab 750 mg.      norgestimate-ethinyl estradioL (ORTHO-CYCLEN) 0.25-35 mg-mcg per tablet       sumatriptan (IMITREX) 50 MG tablet 50 mg.      [DISCONTINUED] sumatriptan (IMITREX) 100 MG tablet Take 1 tablet (100 mg total) by mouth every 2 (two) hours as needed. Max of 2 pills per day. 30 tablet 0    [DISCONTINUED] norgestimate-ethinyl estradiol (ORTHO TRI-CYCLEN LO) 0.18/0.215/0.25 mg-25 mcg tablet Take by mouth.      [DISCONTINUED] propranoloL (INDERAL) 20 mg/5 mL (4 mg/mL) Soln        No current facility-administered medications on file prior to visit.       Physical Exam:  BP (!) 140/80   Pulse (!) 59   Ht 5' 2" (1.575 m)   Wt 83.9 kg (184 lb 15.5 oz)   BMI 33.83 kg/m²   Body mass index is 33.83 kg/m².  Physical Exam  Constitutional:       General: She is not in acute distress.  HENT:      Head: Normocephalic.   Abdominal:      General: Bowel sounds are normal.   Neurological:      General: No focal deficit present.      Mental Status: She is alert.   Psychiatric:         Mood and Affect: Mood normal.         Judgment: Judgment normal.         Labs: Pertinent labs reviewed.  CRC Screening: due     Assessment:  1. Colon cancer screening    2. Lower abdominal pain      Recommendations:   - xray today  - colonoscopy  - A referral has been placed for endoscopy procedure scheduling and phone/audio appointment has been scheduled.      Colon cancer screening  -     Ambulatory referral/consult to Endo Procedure   -     polyethylene glycol (GOLYTELY) 236-22.74-6.74 -5.86 gram suspension; Take 4,000 mLs (4 L total) by mouth once. for 1 dose  Dispense: 4000 mL; Refill: 0    Lower abdominal pain  -     X-Ray Abdomen Flat And Erect; Future; Expected date: 02/22/2024        No follow-ups on file.    Thank you so much for allowing me to participate in the " care of Abiola Young, DIANEP-C     0